# Patient Record
Sex: FEMALE | Race: WHITE | NOT HISPANIC OR LATINO | Employment: FULL TIME | URBAN - NONMETROPOLITAN AREA
[De-identification: names, ages, dates, MRNs, and addresses within clinical notes are randomized per-mention and may not be internally consistent; named-entity substitution may affect disease eponyms.]

---

## 2019-04-23 ENCOUNTER — OFFICE VISIT CONVERTED (OUTPATIENT)
Dept: FAMILY MEDICINE CLINIC | Age: 54
End: 2019-04-23
Attending: NURSE PRACTITIONER

## 2019-05-21 ENCOUNTER — OFFICE VISIT CONVERTED (OUTPATIENT)
Dept: FAMILY MEDICINE CLINIC | Age: 54
End: 2019-05-21
Attending: NURSE PRACTITIONER

## 2019-06-03 ENCOUNTER — OFFICE VISIT CONVERTED (OUTPATIENT)
Dept: FAMILY MEDICINE CLINIC | Age: 54
End: 2019-06-03
Attending: NURSE PRACTITIONER

## 2019-06-03 ENCOUNTER — HOSPITAL ENCOUNTER (OUTPATIENT)
Dept: OTHER | Facility: HOSPITAL | Age: 54
Discharge: HOME | End: 2019-06-03
Attending: NURSE PRACTITIONER

## 2019-06-06 LAB
CONV LAST MENSTURAL PERIOD: NORMAL
HPV HYBRID CAPTURE HIGH RISK: NEGATIVE
SPECIMEN SOURCE: NORMAL
SPECIMEN SOURCE: NORMAL
THIN PREP CVX: NORMAL

## 2019-06-12 ENCOUNTER — HOSPITAL ENCOUNTER (OUTPATIENT)
Dept: OTHER | Facility: HOSPITAL | Age: 54
Discharge: HOME | End: 2019-06-12
Attending: NURSE PRACTITIONER

## 2019-06-12 LAB
ALBUMIN SERPL-MCNC: 4.3 G/DL (ref 3.5–5)
ALBUMIN/GLOB SERPL: 1.7 {RATIO} (ref 1.4–2.6)
ALP SERPL-CCNC: 77 U/L (ref 53–141)
ALT SERPL-CCNC: 17 U/L (ref 10–40)
ANION GAP SERPL CALC-SCNC: 16 MMOL/L (ref 8–19)
AST SERPL-CCNC: 15 U/L (ref 15–50)
BASOPHILS # BLD MANUAL: 0.04 10*3/UL (ref 0–0.2)
BASOPHILS NFR BLD MANUAL: 0.5 % (ref 0–3)
BILIRUB SERPL-MCNC: 0.26 MG/DL (ref 0.2–1.3)
BUN SERPL-MCNC: 9 MG/DL (ref 5–25)
BUN/CREAT SERPL: 12 {RATIO} (ref 6–20)
CALCIUM SERPL-MCNC: 9.2 MG/DL (ref 8.7–10.4)
CHLORIDE SERPL-SCNC: 104 MMOL/L (ref 99–111)
CHOLEST SERPL-MCNC: 161 MG/DL (ref 107–200)
CHOLEST/HDLC SERPL: 3 {RATIO} (ref 3–6)
CONV CO2: 24 MMOL/L (ref 22–32)
CONV TOTAL PROTEIN: 6.9 G/DL (ref 6.3–8.2)
CREAT UR-MCNC: 0.78 MG/DL (ref 0.5–0.9)
DEPRECATED RDW RBC AUTO: 39.3 FL
EOSINOPHIL # BLD MANUAL: 0.08 10*3/UL (ref 0–0.7)
EOSINOPHIL NFR BLD MANUAL: 0.9 % (ref 0–7)
ERYTHROCYTE [DISTWIDTH] IN BLOOD BY AUTOMATED COUNT: 11.7 % (ref 11.5–14.5)
GFR SERPLBLD BASED ON 1.73 SQ M-ARVRAT: >60 ML/MIN/{1.73_M2}
GLOBULIN UR ELPH-MCNC: 2.6 G/DL (ref 2–3.5)
GLUCOSE SERPL-MCNC: 93 MG/DL (ref 65–99)
GRANS (ABSOLUTE): 6.65 10*3/UL (ref 2–8)
GRANS: 75.2 % (ref 30–85)
HBA1C MFR BLD: 14.4 G/DL (ref 12–16)
HCT VFR BLD AUTO: 41.7 % (ref 37–47)
HDLC SERPL-MCNC: 53 MG/DL (ref 40–60)
IMM GRANULOCYTES # BLD: 0.03 10*3/UL (ref 0–0.54)
IMM GRANULOCYTES NFR BLD: 0.3 % (ref 0–0.43)
LDLC SERPL CALC-MCNC: 82 MG/DL (ref 70–100)
LYMPHOCYTES # BLD MANUAL: 1.64 10*3/UL (ref 1–5)
LYMPHOCYTES NFR BLD MANUAL: 4.6 % (ref 3–10)
MCH RBC QN AUTO: 31.2 PG (ref 27–31)
MCHC RBC AUTO-ENTMCNC: 34.5 G/DL (ref 33–37)
MCV RBC AUTO: 90.5 FL (ref 81–99)
MONOCYTES # BLD AUTO: 0.41 10*3/UL (ref 0.2–1.2)
OSMOLALITY SERPL CALC.SUM OF ELEC: 288 MOSM/KG (ref 273–304)
PLATELET # BLD AUTO: 266 10*3/UL (ref 130–400)
PMV BLD AUTO: 9.4 FL (ref 7.4–10.4)
POTASSIUM SERPL-SCNC: 4.3 MMOL/L (ref 3.5–5.3)
RBC # BLD AUTO: 4.61 10*6/UL (ref 4.2–5.4)
SODIUM SERPL-SCNC: 140 MMOL/L (ref 135–147)
TRIGL SERPL-MCNC: 128 MG/DL (ref 40–150)
TSH SERPL-ACNC: 2.09 M[IU]/L (ref 0.27–4.2)
VARIANT LYMPHS NFR BLD MANUAL: 18.5 % (ref 20–45)
VLDLC SERPL-MCNC: 26 MG/DL (ref 5–37)
WBC # BLD AUTO: 8.85 10*3/UL (ref 4.8–10.8)

## 2019-12-06 ENCOUNTER — OFFICE VISIT CONVERTED (OUTPATIENT)
Dept: FAMILY MEDICINE CLINIC | Age: 54
End: 2019-12-06
Attending: NURSE PRACTITIONER

## 2020-01-29 ENCOUNTER — OFFICE VISIT CONVERTED (OUTPATIENT)
Dept: FAMILY MEDICINE CLINIC | Age: 55
End: 2020-01-29
Attending: NURSE PRACTITIONER

## 2020-07-08 ENCOUNTER — OFFICE VISIT CONVERTED (OUTPATIENT)
Dept: FAMILY MEDICINE CLINIC | Age: 55
End: 2020-07-08
Attending: NURSE PRACTITIONER

## 2020-08-05 ENCOUNTER — HOSPITAL ENCOUNTER (OUTPATIENT)
Dept: OTHER | Facility: HOSPITAL | Age: 55
Discharge: HOME | End: 2020-08-05
Attending: NURSE PRACTITIONER

## 2020-08-05 LAB
ALBUMIN SERPL-MCNC: 4 G/DL (ref 3.5–5)
ALBUMIN/GLOB SERPL: 1.4 {RATIO} (ref 1.4–2.6)
ALP SERPL-CCNC: 73 U/L (ref 53–141)
ALT SERPL-CCNC: 22 U/L (ref 10–40)
ANION GAP SERPL CALC-SCNC: 15 MMOL/L (ref 8–19)
AST SERPL-CCNC: 19 U/L (ref 15–50)
BASOPHILS # BLD MANUAL: 0.06 10*3/UL (ref 0–0.2)
BASOPHILS NFR BLD MANUAL: 0.9 % (ref 0–3)
BILIRUB SERPL-MCNC: 0.29 MG/DL (ref 0.2–1.3)
BUN SERPL-MCNC: 11 MG/DL (ref 5–25)
BUN/CREAT SERPL: 13 {RATIO} (ref 6–20)
CALCIUM SERPL-MCNC: 9.2 MG/DL (ref 8.7–10.4)
CHLORIDE SERPL-SCNC: 103 MMOL/L (ref 99–111)
CHOLEST SERPL-MCNC: 186 MG/DL (ref 107–200)
CHOLEST/HDLC SERPL: 3.5 {RATIO} (ref 3–6)
CONV CO2: 26 MMOL/L (ref 22–32)
CONV TOTAL PROTEIN: 6.8 G/DL (ref 6.3–8.2)
CREAT UR-MCNC: 0.86 MG/DL (ref 0.5–0.9)
DEPRECATED RDW RBC AUTO: 39.8 FL
EOSINOPHIL # BLD MANUAL: 0.07 10*3/UL (ref 0–0.7)
EOSINOPHIL NFR BLD MANUAL: 1.1 % (ref 0–7)
ERYTHROCYTE [DISTWIDTH] IN BLOOD BY AUTOMATED COUNT: 11.8 % (ref 11.5–14.5)
GFR SERPLBLD BASED ON 1.73 SQ M-ARVRAT: >60 ML/MIN/{1.73_M2}
GLOBULIN UR ELPH-MCNC: 2.8 G/DL (ref 2–3.5)
GLUCOSE SERPL-MCNC: 86 MG/DL (ref 65–99)
GRANS (ABSOLUTE): 4.54 10*3/UL (ref 2–8)
GRANS: 70.9 % (ref 30–85)
HBA1C MFR BLD: 13.7 G/DL (ref 12–16)
HCT VFR BLD AUTO: 38.9 % (ref 37–47)
HDLC SERPL-MCNC: 53 MG/DL (ref 40–60)
IMM GRANULOCYTES # BLD: 0.01 10*3/UL (ref 0–0.54)
IMM GRANULOCYTES NFR BLD: 0.2 % (ref 0–0.43)
LDLC SERPL CALC-MCNC: 96 MG/DL (ref 70–100)
LYMPHOCYTES # BLD MANUAL: 1.38 10*3/UL (ref 1–5)
LYMPHOCYTES NFR BLD MANUAL: 5.3 % (ref 3–10)
MCH RBC QN AUTO: 32.2 PG (ref 27–31)
MCHC RBC AUTO-ENTMCNC: 35.2 G/DL (ref 33–37)
MCV RBC AUTO: 91.5 FL (ref 81–99)
MONOCYTES # BLD AUTO: 0.34 10*3/UL (ref 0.2–1.2)
OSMOLALITY SERPL CALC.SUM OF ELEC: 289 MOSM/KG (ref 273–304)
PLATELET # BLD AUTO: 266 10*3/UL (ref 130–400)
PMV BLD AUTO: 9.1 FL (ref 7.4–10.4)
POTASSIUM SERPL-SCNC: 4.1 MMOL/L (ref 3.5–5.3)
RBC # BLD AUTO: 4.25 10*6/UL (ref 4.2–5.4)
SODIUM SERPL-SCNC: 140 MMOL/L (ref 135–147)
TRIGL SERPL-MCNC: 185 MG/DL (ref 40–150)
TSH SERPL-ACNC: 1.56 M[IU]/L (ref 0.27–4.2)
VARIANT LYMPHS NFR BLD MANUAL: 21.6 % (ref 20–45)
VLDLC SERPL-MCNC: 37 MG/DL (ref 5–37)
WBC # BLD AUTO: 6.4 10*3/UL (ref 4.8–10.8)

## 2020-08-06 LAB — HCV AB SER DONR QL: 0.1 S/CO RATIO (ref 0–0.9)

## 2020-09-10 ENCOUNTER — HOSPITAL ENCOUNTER (OUTPATIENT)
Dept: OTHER | Facility: HOSPITAL | Age: 55
Discharge: HOME | End: 2020-09-10
Attending: NURSE PRACTITIONER

## 2020-09-22 ENCOUNTER — HOSPITAL ENCOUNTER (OUTPATIENT)
Dept: OTHER | Facility: HOSPITAL | Age: 55
Discharge: HOME | End: 2020-09-22
Attending: NURSE PRACTITIONER

## 2020-09-30 ENCOUNTER — HOSPITAL ENCOUNTER (OUTPATIENT)
Dept: MAMMOGRAPHY | Facility: HOSPITAL | Age: 55
Discharge: HOME | End: 2020-09-30
Attending: NURSE PRACTITIONER

## 2020-10-15 ENCOUNTER — OFFICE VISIT CONVERTED (OUTPATIENT)
Dept: SURGERY | Facility: CLINIC | Age: 55
End: 2020-10-15
Attending: SURGERY

## 2020-10-27 ENCOUNTER — HOSPITAL ENCOUNTER (OUTPATIENT)
Dept: ULTRASOUND IMAGING | Facility: HOSPITAL | Age: 55
Discharge: HOME | End: 2020-10-27
Attending: SURGERY

## 2020-11-04 ENCOUNTER — OFFICE VISIT CONVERTED (OUTPATIENT)
Dept: SURGERY | Facility: CLINIC | Age: 55
End: 2020-11-04
Attending: SURGERY

## 2020-11-06 ENCOUNTER — HOSPITAL ENCOUNTER (OUTPATIENT)
Dept: MRI IMAGING | Facility: HOSPITAL | Age: 55
Discharge: HOME | End: 2020-11-06
Attending: SURGERY

## 2020-11-06 LAB
CREAT BLD-MCNC: 0.8 MG/DL (ref 0.6–1.4)
GFR SERPLBLD BASED ON 1.73 SQ M-ARVRAT: >60 ML/MIN/{1.73_M2}

## 2020-11-12 ENCOUNTER — OFFICE VISIT CONVERTED (OUTPATIENT)
Dept: PLASTIC SURGERY | Facility: CLINIC | Age: 55
End: 2020-11-12
Attending: PLASTIC SURGERY

## 2020-11-12 ENCOUNTER — CONVERSION ENCOUNTER (OUTPATIENT)
Dept: PLASTIC SURGERY | Facility: CLINIC | Age: 55
End: 2020-11-12

## 2020-11-13 ENCOUNTER — HOSPITAL ENCOUNTER (OUTPATIENT)
Dept: PHYSICAL THERAPY | Facility: CLINIC | Age: 55
Setting detail: RECURRING SERIES
Discharge: HOME | End: 2020-11-16
Attending: SURGERY

## 2020-11-18 ENCOUNTER — HOSPITAL ENCOUNTER (OUTPATIENT)
Dept: MRI IMAGING | Facility: HOSPITAL | Age: 55
Discharge: HOME | End: 2020-11-18
Attending: SURGERY

## 2020-11-25 ENCOUNTER — HOSPITAL ENCOUNTER (OUTPATIENT)
Dept: PREADMISSION TESTING | Facility: HOSPITAL | Age: 55
Discharge: HOME | End: 2020-11-25
Attending: SURGERY

## 2020-11-27 LAB — SARS-COV-2 RNA SPEC QL NAA+PROBE: NOT DETECTED

## 2020-12-01 ENCOUNTER — HOSPITAL ENCOUNTER (OUTPATIENT)
Dept: PERIOP | Facility: HOSPITAL | Age: 55
Setting detail: HOSPITAL OUTPATIENT SURGERY
Discharge: HOME | End: 2020-12-01
Attending: SURGERY

## 2020-12-04 ENCOUNTER — CONVERSION ENCOUNTER (OUTPATIENT)
Dept: PLASTIC SURGERY | Facility: CLINIC | Age: 55
End: 2020-12-04

## 2020-12-04 ENCOUNTER — OFFICE VISIT CONVERTED (OUTPATIENT)
Dept: PLASTIC SURGERY | Facility: CLINIC | Age: 55
End: 2020-12-04
Attending: NURSE PRACTITIONER

## 2020-12-09 ENCOUNTER — OFFICE VISIT CONVERTED (OUTPATIENT)
Dept: SURGERY | Facility: CLINIC | Age: 55
End: 2020-12-09
Attending: SURGERY

## 2020-12-11 ENCOUNTER — CONVERSION ENCOUNTER (OUTPATIENT)
Dept: PLASTIC SURGERY | Facility: CLINIC | Age: 55
End: 2020-12-11

## 2020-12-11 ENCOUNTER — OFFICE VISIT CONVERTED (OUTPATIENT)
Dept: PLASTIC SURGERY | Facility: CLINIC | Age: 55
End: 2020-12-11
Attending: NURSE PRACTITIONER

## 2020-12-22 ENCOUNTER — OFFICE VISIT CONVERTED (OUTPATIENT)
Dept: FAMILY MEDICINE CLINIC | Age: 55
End: 2020-12-22
Attending: NURSE PRACTITIONER

## 2021-01-08 ENCOUNTER — OFFICE VISIT CONVERTED (OUTPATIENT)
Dept: PLASTIC SURGERY | Facility: CLINIC | Age: 56
End: 2021-01-08
Attending: PLASTIC SURGERY

## 2021-02-09 ENCOUNTER — OFFICE VISIT CONVERTED (OUTPATIENT)
Dept: ONCOLOGY | Facility: HOSPITAL | Age: 56
End: 2021-02-09
Attending: INTERNAL MEDICINE

## 2021-02-09 ENCOUNTER — HOSPITAL ENCOUNTER (OUTPATIENT)
Dept: ONCOLOGY | Facility: HOSPITAL | Age: 56
Discharge: HOME | End: 2021-02-09
Attending: INTERNAL MEDICINE

## 2021-03-12 ENCOUNTER — OFFICE VISIT CONVERTED (OUTPATIENT)
Dept: ONCOLOGY | Facility: HOSPITAL | Age: 56
End: 2021-03-12
Attending: NURSE PRACTITIONER

## 2021-03-12 ENCOUNTER — HOSPITAL ENCOUNTER (OUTPATIENT)
Dept: ONCOLOGY | Facility: HOSPITAL | Age: 56
Discharge: HOME | End: 2021-03-12
Attending: NURSE PRACTITIONER

## 2021-03-12 ENCOUNTER — OFFICE VISIT CONVERTED (OUTPATIENT)
Dept: PLASTIC SURGERY | Facility: CLINIC | Age: 56
End: 2021-03-12
Attending: PLASTIC SURGERY

## 2021-05-11 NOTE — H&P
"   History and Physical      Patient Name: Audrey Damon   Patient ID: [id number]   Sex: Female   YOB: 1965    Primary Care Provider: Christina BOSS   Referring Provider: Christina BOSS    Visit Date: November 4, 2020    Provider: Sena Hawkins MD   Location: [facility]   Location Address: [address] [place] [address]  [id number]   Location Phone: [phone]          Chief Complaint  · Breast Cancer  · Pre-Surgical History and Physical Examination for [facility]  · Consents for Surgery      History Of Present Illness  Audrey Damon is a 54 year old /White female who is referred from Christina BOSS ; she had a 0.7 cm right breast posterioroly located at 1 oclcok about 9 cm FTN and it was biopsied and returned as:   Palpation-guided or Image Guided needle biopsy:    * Image guided needle biospy performed. Results from the needle guided biopsy are IDC grade 1 ER/DC + and her 2 negative with ki 67 of 2%.   Breast Cancer Staging:  * T 1 ;N 0 ;M 0   * 1 Stage Group       Past Medical History  High blood pressure         Medication List  losartan 50 mg oral tablet         Allergy List  NO KNOWN DRUG ALLERGIES         Family Medical History  Diabetes, unspecified type         Social History  Tobacco (Never)         Review of Systems  · Constitutional  o Denies  o : fever, chills  · Breasts  o * See HPI  · Cardiovascular  o Denies  o : chest pain, cardiac murmurs, irregular heart beats, dyspnea on exertion  · Integument  o Denies  o : rash, itching, new skin lesions  · Heme-Lymph  o Denies  o : easy bleeding, easy bruising, lymph node enlargement or tenderness      Vitals  Date Time BP Position Site L\R Cuff Size HR RR TEMP (F) WT  HT  BMI kg/m2 BSA m2 O2 Sat FR L/min FiO2        11/04/2020 11:14 AM       16  189lbs 0oz 5'  5\" 31.45 1.98             Physical Examination  · Constitutional  o Appearance  o : A well-nourished, " well-developed patient who ambulates without difficulty, Alert and Oriented X3.  · Respiratory  o Respiratory Effort  o : breathing unlabored  o Inspection of Chest  o : breaths equal bilaterally  · Breasts  o Inspection of Breasts  o : developmental state normal for age  o Palpation of Breasts, Axillae  o : no masses or tenderness noted on palpation, well helaed biopsy site, no LAD, no skin or nipple lesions          Assessment  · Malignant neoplasm of upper-inner quadrant of right breast in female, estrogen receptor positive       Malignant neoplasm of upper-inner quadrant of right female breast     174.2/C50.211  Estrogen receptor positive status [ER+]     174.2/Z17.0  · Preoperative Examination     V72.83      Plan  · Orders  o General Surgery Order (GENOR) - - 11/16/2020  o Lymphedema Clinic Consult (Order_PT/OT Consult for Bioimpedence Lymph fluid analysis,Pre-op and retest Post-op, every 3 mo. Yr. 1-3, 6 mo. Yr. 4-5, annually year 6+) (LYMCL) - 174.2/C50.211, 174.2/Z17.0 - 11/04/2020  o Plastics reconstructive visit (PSREC) - 174.2/C50.211, 174.2/Z17.0 - 11/04/2020  o [place] Node(Right)Identification(Detection) [[id number]] (61528) - 174.2/C50.211, 174.2/Z17.0 - 12/01/2020  o Needle localization, percutaneous, for wire placement, 1st lesion, mammographic guidance [facility] (50038) - 174.2/C50.211, 174.2/Z17.0 - 12/01/2020  o Needle localization, percutaneous, for wire placement, 1st lesion, ultrasound guidance [facility] (07545) - 174.2/C50.211, 174.2/Z17.0 - 12/01/2020  o General Surgery Order (GENOR) - 174.2/C50.211, 174.2/Z17.0 - 12/01/2020  o [facility] [place] (89473) - 174.2/C50.211, 174.2/Z17.0 - 11/25/2020  · Medications  o Medications have been Reconciled  o Transition of Care or Provider Policy  · Instructions  o DISCUSSION:  o PLAN:  o She has an MRI on Friday and would like to see Dr Hernandez for possible combo oncoplastic reduction  o Schedule operation  o ****Surgical Treatment  Addendum****  o Breast Conserving Therapy: Offered  o [place] node dissection offfered and will perform as a part of surgery.  o Reconstructive /Plastic Surgery referral offered prior to surgery.  o Handouts Provided-Pre-Procedure Instructions including date and time and location of procedure.  o ****Surgical Orders****  o ****Patient Status****  o RISK AND BENEFITS:  o Consent for surgery: Given these options, the patient has verbally expressed an understanding of the risks of surgery and finds these risks acceptable. We will proceed with surgery as soon as possible.  o Possible risks, complications, benefits, and alternatives to surgical or invasive procedure have been explained to patient and/or legal guardian.  o O.R. PREP: Per protocol  o IV: Per Anesthesia  o Please sign permit for: Right breast needle localized lumpectomy with sentinel node identification and biopsy  o Kefzol 2 grams IV on call to OR.  o The above History and Physical Examination has been completed within 30 days of admission.            Electronically Signed by: Sena Hawkins MD -Author on November 16, 2020 11:46:19 AM

## 2021-05-11 NOTE — H&P
"   History and Physical      Patient Name: Audrey Damon   Patient ID: [id number]   Sex: Female   YOB: 1965    Primary Care Provider: Christina BOSS   Referring Provider: Christina BOSS    Visit Date: October 15, 2020    Provider: Sena Hawkins MD   Location: [facility]   Location Address: [address] [place] [address]  [id number]   Location Phone: [phone]          Chief Complaint  · Rigth breast mass      History Of Present Illness  Audrey Damon is a 54 year old /White female who is referred from Christina BOSS ; she had a 0.7 cm taller than wide mass at 1 oclcok about 9 cm from the nipple and abutting the pec muscle... a stereotactic biopsy was recommended but could not be done and after discussing with Dr Coulter we will try an u/s guided biopsy to localize for biopsy.             Past Medical History  High blood pressure         Medication List  losartan 50 mg oral tablet         Allergy List  NO KNOWN DRUG ALLERGIES       Allergies Reconciled  Family Medical History  Diabetes, unspecified type         Social History  Tobacco (Never)         Review of Systems  · Constitutional  o Denies  o : fever, chills  · Breasts  o * See HPI  · Cardiovascular  o Denies  o : chest pain, cardiac murmurs, irregular heart beats, dyspnea on exertion  · Integument  o Denies  o : rash, itching, new skin lesions  · Heme-Lymph  o Denies  o : easy bleeding, easy bruising, lymph node enlargement or tenderness      Vitals  Date Time BP Position Site L\R Cuff Size HR RR TEMP (F) WT  HT  BMI kg/m2 BSA m2 O2 Sat FR L/min FiO2        10/15/2020 10:07 AM       15  189lbs 0oz 5'  5\" 31.45 1.98             Physical Examination  · Constitutional  o Appearance  o : A well-nourished, well-developed patient who ambulates without difficulty, Alert and Oriented X3.  · Respiratory  o Respiratory Effort  o : breathing unlabored  o Inspection of Chest  o : breaths equal " bilaterally  · Breasts  o Inspection of Breasts  o : developmental state normal for age  o Palpation of Breasts, Axillae  o : no masses or tenderness noted on palpation, no nipple or skin lesions          Assessment  · Right Upper Breast Mass     611.72/N63      Plan  · Orders  o Biopsy, breast, with placement of localization device (clip), first lesion, with ultrasound guidance [facility] ([id number]) - 611.72/N63 - 10/27/2020  · Medications  o Medications have been Reconciled  o Transition of Care or Provider Policy  · Instructions  o PLAN:  o Proceed with biopsy            Electronically Signed by: Sena Hawkins MD -Author on October 15, 2020 10:21:22 AM

## 2021-05-11 NOTE — H&P
History and Physical      Patient Name: Audrey Damon   Patient ID: [id number]   Sex: Female   YOB: 1965    Primary Care Provider: Christina BOSS   Referring Provider: Christina BOSS    Visit Date: November 12, 2020    Provider: Clemencia Hernandez MD   [facility]   Location Address: [address] [place] [address]  [id number]   Location Phone: [phone]          Chief Complaint  · Breast Cancer      History Of Present Illness  Audrey Damon is a 55 year old /White female who presents to the office today as a consult from Christina BOSS.   Audrey Damon is a 55 year old /White female who presents to the office as a consult from Christina BOSS.      Patient states she has seen Dr. Hawkins and done a work up on her right breast for right breast cancer, has another MRI biopsy of additional lesion next week. Patient states she is wearing a D but could be a DD because clothes aren't fitting right. Patient states she would want to be the smallest we can go. Patient states Dr. Hawkins states she doesn't need radiation or chemo. Patient states she was wanting to go with lumpectomy. Patient understands she will need radiation if she goes with lumpectomy. Patient states she has high blood pressure. Patient states is controlled until diagnosed with breast cancer.       Past Medical History  Breast cancer; High blood pressure         Past Surgical History  C section         Medication List  losartan 50 mg oral tablet         Allergy List  NO KNOWN DRUG ALLERGIES         Family Medical History  Heart Disease; Diabetes, unspecified type         Social History  Tobacco (Never)         Review of Systems  · Cardiovascular  o Denies  o : chest pain, lower extremity edema, Heart Attack, Abnormal EKG  · Respiratory  o Denies  o : shortness of breath, wheezing, cough  · Neurologic  o Denies  o : muscular weakness,  incoordination, tingling or numbness, headaches  · Psychiatric  o Denies  o : anxiety, depression, difficulty sleeping      Vitals  Date Time BP Position Site L\R Cuff Size HR RR TEMP (F) WT  HT  BMI kg/m2 BSA m2 O2 Sat FR L/min FiO2 HC       11/12/2020 09:08 /84 Sitting    85 - R  98     97 %  21%          Physical Examination  · Constitutional  o Appearance  o : well developed, well-nourished, Alert and oriented X3  · Eyes  o Sclerae  o : sclerae white  · Respiratory  o Respiratory Effort  o : breathing unlabored   · Cardiovascular  o Heart  o : regular rate  · Breasts  o FEMALE BREAST EXAM  o :   § Masses  § : no masses  § Nipple Discharge  § : no nipple discharge  § Axillary Lymphadenopathy  § : no axillary lymphadenopathy  § SN-N (Right Breast)  § : 29  § SN-N (Left Breast)  § : 28  § SN-IMF (Right Breast)  § : 23  § SN-IMF (Left Breast)  § : 23  § N-IMF stretch (Right Breast)  § : 13  § N-IMF stretch (Left Breast)  § : 13  · Gastrointestinal  o Abdominal Examination  o : abdomen nontender to palpation  · Lymphatic  o Axilla  o : No lymphadenopathy present  · Skin and Subcutaneous Tissue  o General Inspection  o : no lesions present, no areas of discoloration, skin turgor normal, texture normal  · Psychiatric  o Judgement and Insight  o : judgment and insight intact  o Mood and Affect  o : mood normal, affect appropriate     well healed biopsy scar right upper outer quadrant. Right breast hangs lower. Bilateral macromastia                 Assessment  · Pre-operative examination     V72.84/Z01.818  · Breast cancer, female     174.9/C50.919  · Macromastia       Hypertrophy of breast     611.1/N62      Plan  · Orders  o Plastics reconstructive visit (PSREC) - - 11/14/2020  · Medications  o Medications have been Reconciled  o Transition of Care or Provider Policy  · Instructions  o Surgical Facility: [facility]   o ****Surgical Orders****  o ****Patient Status****  o ********************  o RISK  AND BENEFITS:  o Consent for surgery: Given these options, the patient has verbally expressed an understanding of the risks of surgery and finds these risks acceptable. We will proceed with surgery as soon as possible.  o O.R. PREP: Per protocol  o IV: Per Anesthesia  o SCD's preoperatively  o Please sign permit for: bilateral reduction mammaplasty  o Please do not give any anethesia until patient's site has been marked.   o *******************************************  o PRE- OP MEDICATION ORDER:  o *******************************************  o Kefzol 2 grams IV on call to OR.  o Prior to procedure, patient had negative COVID verbal screening and signed COVID consent.  o The above History and Physical Examination has been completed within 30 days of admission.  o ********************  o Breast reconstruction options (Tissue Expander/Implant versus Autologous) were all discussed with the pt at length. In addition, we discussed options for symmetry procedures and nipple reconstruction. The patient understands that her reconstructed breast will not have the same sensation as a natural breast and that visible incision lines will always be present. In addition, patient understands that breast reconstruction is a multi-stage procedure that can take months to years to complete.   o After thorough discussion of risk and benefits of each procedure the patient has elected to proceed with bilateral oncoplastic breast reduction. She understands she is at higher risk of complications due to combo surgery such as wound healing problems and nipple necrosis, asymmetry.  o We discussed the procedure for bilateral breast reduction under general anesthesia as well as the postoperative recovery time and the need for limitation of activities. The risks of surgery were discussed including bleeding, infection, scarring (for which diagrams were drawn), pain, poor healing, loss of skin and or nipple, change in nipple sensation, inability  to breast feed, asymmetry, no guarantee of postoperative cup size.  o Electronically Identified Patient Education Materials Provided Electronically            Electronically Signed by: Clemencia Hernandez MD -Author on November 14, 2020 02:03:08 PM

## 2021-05-11 NOTE — H&P
History and Physical      Patient Name: Audrey Damon   Patient ID: [id number]   Sex: Female   YOB: 1965    Primary Care Provider: Christina BOSS   Referring Provider: Christina BOSS    Visit Date: December 9, 2020    Provider: Sena Hawkins MD   Location: [facility]   Location Address: [address] [place] [address]  [id number]   Location Phone: [phone]          Chief Complaint  · Follow Up Surgery      History Of Present Illness  Audrey Damon is a 55 year old /White female who is here today for follow up of: right breast lumpectomy with sentinel node and reduction by Dr David french.   She is doing well-status post surgery. Pathology was negative margins and negative nodes.       Past Medical History  Breast cancer; High blood pressure         Past Surgical History  breast reduction; C section         Medication List  losartan 50 mg oral tablet         Allergy List  NO KNOWN DRUG ALLERGIES       Allergies Reconciled  Family Medical History  Heart Disease; Diabetes, unspecified type         Social History  Tobacco (Never)         Review of Systems  · Constitutional  o Denies  o : fever, chills  · Eyes  o Denies  o : yellowish discoloration of the eyes, eye pain  · HENT  o Denies  o : difficulty swallowing, hoarseness  · Breasts  o * See HPI  · Cardiovascular  o Denies  o : chest pain on exertion, irregular heart beats  · Respiratory  o Denies  o : shortness of breath, cough  · Gastrointestinal  o Denies  o : nausea, vomiting, diarrhea, constipation  · Integument  o Admits  o : see HPI for surgical incision healing  · Neurologic  o Denies  o : tingling or numbness, loss of balance  · Musculoskeletal  o Denies  o : joint pain, back pain  · Endocrine  o Denies  o : weight gain, weight loss  · All Others Negative      Vitals  Date Time BP Position Site L\R Cuff Size HR RR TEMP (F) WT  HT  BMI kg/m2 BSA m2 O2 Sat FR L/min FiO2 HC      "  12/09/2020 10:41 AM       15  189lbs 0oz 5'  5\" 31.45 1.98             Physical Examination  · Constitutional  o Appearance  o : well developed/well nourished patient in no apparent distress  · Respiratory  o Inspection of Chest  o : equal breaths bilaterally  · Gastrointestinal  o Abdominal Examination  o : soft/nontender, nondistended, no organomegaly appreciated  · Post Surgical Incision  o Surgical wound  o : healing well, no erythema          Assessment  · Postoperative Exam Following Surgery     V67.00/Z09      Plan  · Medications  o Medications have been Reconciled  o Transition of Care or Provider Policy  · Instructions  o Keep apt with Dr Hernandez and needs to see Dr Stuart for rad onc  o Return to office with any issues.  o F/U PRN            Electronically Signed by: Sena Hawkins MD -Author on December 9, 2020 10:47:13 AM  "

## 2021-05-14 VITALS
SYSTOLIC BLOOD PRESSURE: 143 MMHG | TEMPERATURE: 97.1 F | HEART RATE: 76 BPM | OXYGEN SATURATION: 97 % | DIASTOLIC BLOOD PRESSURE: 85 MMHG

## 2021-05-14 VITALS — HEIGHT: 65 IN | RESPIRATION RATE: 15 BRPM | BODY MASS INDEX: 31.49 KG/M2 | WEIGHT: 189 LBS

## 2021-05-14 VITALS
SYSTOLIC BLOOD PRESSURE: 142 MMHG | HEART RATE: 86 BPM | DIASTOLIC BLOOD PRESSURE: 57 MMHG | OXYGEN SATURATION: 96 % | TEMPERATURE: 97.7 F

## 2021-05-14 VITALS — BODY MASS INDEX: 31.49 KG/M2 | HEIGHT: 65 IN | WEIGHT: 189 LBS | RESPIRATION RATE: 15 BRPM

## 2021-05-14 VITALS
SYSTOLIC BLOOD PRESSURE: 128 MMHG | TEMPERATURE: 97.8 F | HEART RATE: 72 BPM | OXYGEN SATURATION: 96 % | DIASTOLIC BLOOD PRESSURE: 85 MMHG

## 2021-05-14 VITALS
TEMPERATURE: 98 F | SYSTOLIC BLOOD PRESSURE: 133 MMHG | DIASTOLIC BLOOD PRESSURE: 84 MMHG | HEART RATE: 85 BPM | OXYGEN SATURATION: 97 %

## 2021-05-14 VITALS
SYSTOLIC BLOOD PRESSURE: 124 MMHG | OXYGEN SATURATION: 95 % | TEMPERATURE: 98 F | HEART RATE: 82 BPM | DIASTOLIC BLOOD PRESSURE: 84 MMHG

## 2021-05-14 VITALS — HEIGHT: 65 IN | RESPIRATION RATE: 16 BRPM | BODY MASS INDEX: 31.49 KG/M2 | WEIGHT: 189 LBS

## 2021-05-14 NOTE — PROGRESS NOTES
Progress Note      Patient Name: Audrey Damon   Patient ID: [id number]   Sex: Female   YOB: 1965    Primary Care Provider: Christina BOSS   Referring Provider: Christina BOSS    Visit Date: December 4, 2020    Provider: KARYN Hurt   Location: [facility]   Location Address: [address] [place] [address]  [id number]   Location Phone: [phone]          Chief Complaint  · Follow Up Visit, Breast Reduction      History Of Present Illness  Audrey Damon is a 55 year old /White female who presents to the office today as a consult from Christina BOSS.   Audrey Damon is a 55 year old /White female who presents to the office today for a follow up visit status post breast reduction on 12/01/2020. She is doing very well. Steri-strips have some dried blood around them under breast.   Pain Rating on the Casi's Scale is: 1/10       Past Medical History  Breast cancer; High blood pressure         Past Surgical History  breast reduction; C section         Medication List  losartan 50 mg oral tablet         Allergy List  NO KNOWN DRUG ALLERGIES         Family Medical History  Heart Disease; Diabetes, unspecified type         Social History  Tobacco (Never)         Review of Systems  · Cardiovascular  o Denies  o : chest pain, lower extremity edema, Heart Attack, Abnormal EKG  · Respiratory  o Denies  o : shortness of breath, wheezing, cough  · Neurologic  o Denies  o : muscular weakness, incoordination, tingling or numbness, headaches  · Psychiatric  o Denies  o : anxiety, depression, difficulty sleeping      Vitals  Date Time BP Position Site L\R Cuff Size HR RR TEMP (F) WT  HT  BMI kg/m2 BSA m2 O2 Sat FR L/min FiO2 HC       12/04/2020 10:06 /84 Sitting    82 - R  98     95 %  21%          Physical Examination  · Constitutional  o Appearance  o : well developed, well-nourished, Alert and oriented  X3  · Respiratory  o Respiratory Effort  o : breathing unlabored   · Cardiovascular  o Heart  o : regular rate  · Skin and Subcutaneous Tissue  o Surgical Incision  o : steri strips in place, good symmetry, expected ecchymosis and edema, nipples viable, no drng          Assessment  · Postoperative follow-up     V67.00/Z09  · Macromastia       Hypertrophy of breast     611.1/N62      Plan  · Orders  o Plastics reconstructive visit (PSREC) - - 12/04/2020  · Medications  o Medications have been Reconciled  o Transition of Care or Provider Policy  · Instructions  o Steri-strips under breast changed due to dried bloody drainage, continue bra, limit activity, wean pain medications, RTC 1 week.            Electronically Signed by: KARYN Hurt -Author on December 4, 2020 11:01:04 AM

## 2021-05-14 NOTE — PROGRESS NOTES
Progress Note      Patient Name: Audrey Damon   Patient ID: [id number]   Sex: Female   YOB: 1965    Primary Care Provider: Christina BOSS   Referring Provider: Christina BOSS    Visit Date: December 11, 2020    Provider: KARYN Hurt   Location: [facility]   Location Address: [address] [place] [address]  [id number]   Location Phone: [phone]          History Of Present Illness  Audrey Damon is a 55 year old /White female who presents to the office today as a consult from Christina BOSS.   Audrey Damon is a 55 year old /White female who presents to the office today for a follow up visit status post breast reduction on 12/01/2020. She is doing very well.   Pain Rating on the Casi's Scale is: 1/10       Past Medical History  Breast cancer; High blood pressure         Past Surgical History  breast reduction; C section         Medication List  losartan 50 mg oral tablet         Allergy List  NO KNOWN DRUG ALLERGIES         Family Medical History  Heart Disease; Diabetes, unspecified type         Social History  Tobacco (Never)         Vitals  Date Time BP Position Site L\R Cuff Size HR RR TEMP (F) WT  HT  BMI kg/m2 BSA m2 O2 Sat FR L/min FiO2 HC       12/11/2020 03:11 /57 Sitting    86 - R  97.7     96 %  21%          Physical Examination  · Constitutional  o Appearance  o : well developed, well-nourished, Alert and oriented X3  · Respiratory  o Respiratory Effort  o : breathing unlabored   · Cardiovascular  o Heart  o : regular rate  · Skin and Subcutaneous Tissue  o Surgical Incision  o : steri strips in place, good symmetry, expected ecchymosis and edema, nipples viable, no drng, small firm area medial portion of right breast IMF incision, moderate tenderness, no redness, ecchymosis, lymph node biopsy site wnl          Assessment  · Postoperative  follow-up     V67.00/Z09  · Macromastia       Hypertrophy of breast     611.1/N62      Plan  · Orders  o Plastics reconstructive visit (PSREC) - - 12/11/2020  · Medications  o Medications have been Reconciled  o Transition of Care or Provider Policy  · Instructions  o continue bra, limit activity, may drive, may shower, RTC 1 month post op.  o Electronically Identified Patient Education Materials Provided Electronically            Electronically Signed by: KARYN Hurt -Author on December 11, 2020 03:28:50 PM

## 2021-05-14 NOTE — PROGRESS NOTES
Progress Note      Patient Name: Audrey Damon   Patient ID: [id number]   Sex: Female   YOB: 1965    Primary Care Provider: Christina BOSS   Referring Provider: Christina BOSS    Visit Date: January 8, 2021    Provider: Clemenica Hernandez MD   [facility]: [facility]   Location Address: [address] [place] [address]  [id number]   Location Phone: [phone]          History Of Present Illness  Audrey Damon is a 55 year old /White female who presents to the office today as a consult from Christina BOSS.   Audrey Damon is a 55 year old /White female who presents to the office today for a follow up visit status post breast reduction on 12/01/2020. She is doing very well.   Pain Rating on the [place]'s Scale is: none      Here today for steri strip removal. Patient noticed a lump on the top of her right breast this week. Denies pain.       Past Medical History  Breast cancer; High blood pressure         Past Surgical History  breast reduction; C section         Medication List  losartan 50 mg oral tablet         Allergy List  NO KNOWN DRUG ALLERGIES         Family Medical History  Heart Disease; Diabetes, unspecified type         Social History  Tobacco (Never)         Vitals  Date Time BP Position Site L\R Cuff Size HR RR TEMP (F) WT  HT  BMI kg/m2 BSA m2 O2 Sat FR L/min FiO2 HC       01/08/2021 08:40 /85 Sitting    76 - R  97.1     97 %  21%          Physical Examination  · Constitutional  o Appearance  o : well developed, well-nourished, Alert and oriented X3  · Respiratory  o Respiratory Effort  o : breathing unlabored   · Cardiovascular  o Heart  o : regular rate  · Skin and Subcutaneous Tissue  o Surgical Incision  o : well healed, good contour          Assessment  · Postoperative follow-up     V67.00/Z09  · Macromastia       Hypertrophy of breast     611.1/N62      Plan  · Orders  o Plastics reconstructive visit  (PSREC) - - 01/08/2021  · Medications  o Medications have been Reconciled  o Transition of Care or Provider Policy  · Instructions  o bra, Aquaphor and scar massage, ok to start radiation, rtc 3 months            Electronically Signed by: Clemencia Hernandez MD -Author on January 8, 2021 04:16:59 PM

## 2021-05-14 NOTE — PROGRESS NOTES
Progress Note      Patient Name: Audrey Damon   Patient ID: [id number]   Sex: Female   YOB: 1965    Primary Care Provider: Christina BOSS   Referring Provider: Christina BOSS    Visit Date: March 12, 2021    Provider: Clemencia Hernandez MD   Location: [facility]   Location Address: [address] [place] [address]  [id number]   Location Phone: [phone]          History Of Present Illness  Audrey Daomn is a 55 year old /White female who presents to the office today as a consult from Christina BOSS.   Audrey Damon is a 55 year old /White female who presents to the office today for a follow up visit status post breast reduction on 12/01/2020. She is doing very well.   Pain Rating on the [place]'s Scale is: none      Doing well. Finished radiation 2/15/21. Had 20 treatments. Denies pain. Is on Tamoxifen now.       Past Medical History  Breast cancer; High blood pressure         Past Surgical History  breast reduction; C section         Medication List  losartan 50 mg oral tablet; tamoxifen 10 mg oral tablet         Allergy List  NO KNOWN DRUG ALLERGIES         Family Medical History  Heart Disease; Diabetes, unspecified type         Social History  Tobacco (Never)         Vitals  Date Time BP Position Site L\R Cuff Size HR RR TEMP (F) WT  HT  BMI kg/m2 BSA m2 O2 Sat FR L/min FiO2 HC       03/12/2021 09:42 /85 Sitting    72 - R  97.8     96 %  21%          Physical Examination  · Constitutional  o Appearance  o : well developed, well-nourished, Alert and oriented X3  · Respiratory  o Respiratory Effort  o : breathing unlabored   · Cardiovascular  o Heart  o : regular rate  · Skin and Subcutaneous Tissue  o Surgical Incision  o : well healed, good contour, radiation changes noted          Assessment  · Breast cancer, female     174.9/C50.919  · Macromastia       Hypertrophy of  breast     611.1/N62      Plan  · Orders  o Plastics reconstructive visit ([facility]) - - 03/13/2021  o Mammogram breast screening 2D digital bilateral. (, 21383) - 611.1/N62 - 06/02/2021   S/p bilateral oncoplastic breast reduction 12/1/2020. New baseline mammogram.   · Medications  o Medications have been Reconciled  o Transition of Care or Provider Policy  · Instructions  o Follow Up in 3 months after repeat new baseline mammogram.  o Continue with scar massage and lotion.            Electronically Signed by: Clemencia Hernandez MD -Author on March 13, 2021 03:00:30 PM

## 2021-05-18 NOTE — PROGRESS NOTES
Woody DamonEileen 1965     Preventive Medicine Services    Visit Date: Mon, Rubén 3, 2019 09:13 am    Provider: Christina Romero N.P. (Assistant: Sarah Spurling, MA)    Location: [facility]        Electronically signed by Christina Romero N.P. on  06/03/2019 02:44:50 PM                             SUBJECTIVE:        CC:     Ms. Damon is a 53 year old White female.  She is here for an annual exam.  Well woman.;         HPI:         PHQ-9 Depression Screening: Completed form scanned and in chart; Total Score 5 Alcohol Consumption Screening: Completed form scanned and in chart; Total Score 0         Her last gynecological exam was four years ago.  The patient's LMP on 5/21/19.  She has no current complaints.  Her last Pap smear was in unknown.  Her last mammogram was scheduled today.  The patient has not previously undergone a DEXA scan.  She performs breast self-exams occasionally.  She is not currently using any form of contraception.      ROS:     CONSTITUTIONAL:  Negative for chills, fatigue and fever.      CARDIOVASCULAR:  Negative for chest pain and pedal edema.      RESPIRATORY:  Negative for recent cough and dyspnea.      GASTROINTESTINAL:  Negative for abdominal pain, constipation, diarrhea, heartburn, nausea and vomiting.      GENITOURINARY:  Positive for urinary incontinence stress incontinence.   Negative for dysuria, vaginal discharge, vaginal itching or change in urine stream.      INTEGUMENTARY/BREAST:  Negative for pruritis and rash.      NEUROLOGICAL:  Negative for dizziness, fainting, headaches and paresthesias.      ENDOCRINE:  Negative for hair loss, polydipsia and polyphagia.      ALLERGIC/IMMUNOLOGIC:  Negative for seasonal allergies.      PSYCHIATRIC:  Negative for anxiety, depression and suicidal thoughts.          PMH/FMH/SH:     Last Reviewed on 6/03/2019 02:44 PM by Christina Romero    Past Medical History:             PREVENTIVE HEALTH MAINTENANCE             BONE  DENSITY: has never been done     DENTAL CLEANING: was last done  - [facility]     EYE EXAM: was last done  - Dr. 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  Non-drinker     Caffeine:  She admits to consuming caffeine via tea.          Substance Abuse History:     Last Reviewed on 2019 06:00 PM by Christina Romero    None         Mental Health History:     Last Reviewed on 2019 06:00 PM by Christina Romero    NEGATIVE         Communicable Diseases (eg STDs):     Last Reviewed on 2019 06:00 PM by Christina Romero    Reportable health conditions; NEGATIVE             Current Problems:     Last Reviewed on 2019 02:44 PM by Christina Romero    Screening for cholesterol level     Fatigue     High blood pressure     Screening mammogram - other     Screening for depression         Immunizations:     zzFluzone pf-quadrivalent 3 and up 10/1/2018         Allergies:     Last Reviewed on 2019 09:21 AM by Spurling, Sarah C      No Known Drug Allergies.         Current Medications:     Last Reviewed on 2019 09:21 AM by Spurling, Sarah C    Aspirin (ASA) 81mg Tablets, Enteric Coated 1 tab daily     Lisinopril 20mg Tablet 1 tab daily          OBJECTIVE:        Vitals:         Current: 6/3/2019 9:24:01 AM    Ht:  5 ft, 6 in;  Wt: 184.4 lbs;  BMI: 29.8    T: 98.1 F (oral);  BP: 130/83 mm Hg (left arm, sitting);  P: 91 bpm (left arm (BP Cuff), sitting);  sCr: 0.78 mg/dL;  GFR: 91.25        Exams:     PHYSICAL EXAM:     GENERAL: vital signs recorded - well developed, well nourished;  no apparent distress;     NECK: range of motion is normal;     RESPIRATORY: normal appearance and symmetric expansion of chest wall; normal respiratory rate and pattern with no distress; normal breath sounds with no rales, rhonchi, wheezes or rubs;     CARDIOVASCULAR: normal rate; rhythm is regular;  no edema;     GASTROINTESTINAL: nontender; normal bowel sounds; no organomegaly;     GENITOURINARY: Pap smear taken;  external genitalia: normal without lesions or urethral abnormalities;;  vagina: normal with good pelvic support and no lesions or discharge;; cervix: white, non-odorous cervical discharge noted;     LYMPHATIC: no axillary adenopathy;     BREAST/INTEGUMENT: breast exam: thickened skin over the left lower medial quadrant and right lower medial quadrant;  no breast masses;     MUSCULOSKELETAL: normal gait; normal range of motion of all major muscle groups; no limb or joint pain with range of motion;     NEUROLOGIC: mental status: alert and oriented x 3;     PSYCHIATRIC: appropriate affect and demeanor; normal speech pattern; normal thought and perception;         ASSESSMENT           V79.0   Z13.89  Screening for depression              DDx:     V72.31   Z01.419  Routine gynecological examination              DDx:         ORDERS:         Lab Orders:       [id number]  Cytopathology, slides, cervical or vaginal; manual screening under MD supervision  (Send-Out)           Procedures Ordered:       [id number]  Handlg&/or convey of spec for TR office to lab  (In-House)           Other Orders:         Depression screen negative  (In-House)           Negative EtOH screen   (In-House)                   PLAN:          Screening for depression     MIPS Positive Depression Screen but after further evaluation the patient does not have a diagnosis of depression.  Negative alcohol screen           Orders:         Depression screen negative  (In-House)           Negative EtOH screen  (In-House)            Routine gynecological examination           Orders:       [id number]  Cytopathology, slides, cervical or vaginal; manual screening under MD supervision  (Send-Out)         30522  Handlg&/or convey of spec for TR office to lab  (In-House)               CHARGE CAPTURE           **Please note: ICD descriptions below are intended for billing purposes only and may not represent clinical diagnoses**        Primary Diagnosis:         V79.0 Screening for depression            Z13.89    Encounter for screening for other disorder              Orders:          [id number]   Preventive medicine, established patient, age 40-64 years  (In-House)                Depression screen negative  (In-House)                Negative EtOH screen  (In-House)           V72.31 Routine gynecological examination            Z01.419    Encounter for gynecological examination (general) (routine) without abnormal findings              Orders:          71374   Handlg&/or convey of spec for TR office to lab  (In-House)

## 2021-05-18 NOTE — PROGRESS NOTES
Woody Damon 1965     Preventive Medicine Services    Visit Date: Tue, May 21, 2019 05:51 pm    Provider: Christina Romero N.P. (Assistant: Sarah Spurling, MA)    Location: [facility]        Electronically signed by Christina Romero N.P. on  05/21/2019 06:54:16 PM                             SUBJECTIVE:        CC:     Ms. Damon is a 53 year old White female.  This is a follow-up visit.  Well check. - No issues w vision, goes to the eye doc regulary.;         HPI:         Ms. Damon presents with health checkup.  She cannot recall when she last had a physical exam.  Her last menstrual period was 05/21/19.  She is not currently using any form of contraception.  She performs breast self-exams occasionally.  She is current with her Td, MMR, hepatitis B and influenza immunization.  Ms. Damon denies any history of abnormal Pap smears.          PHQ-9 Depression Screening: Completed form scanned and in chart; Total Score 5 Alcohol Consumption Screening: Completed form scanned and in chart; Total Score 0     was recently started on Lisinopril - stopped taking due to thought may have caused her sciatica to flare - only took for 5 days.  She did not hear from our office as to what she was supposed to do after that     ROS:     CONSTITUTIONAL:  Positive for fatigue.   Negative for chills, unintentional weight gain or unintentional weight loss.      EYES:  Positive for use of contact lenses.   Negative for blurred vision.      E/N/T:  Negative for diminished hearing, use of dentures and sore throat.      CARDIOVASCULAR:  Negative for chest pain and pedal edema.      RESPIRATORY:  Negative for recent cough, dyspnea and frequent wheezing.      GASTROINTESTINAL:  Positive for IBS with diarrhea.   Negative for abdominal pain, acid reflux symptoms, constipation, diarrhea, heartburn, nausea or vomiting.      GENITOURINARY:  Positive for urinary incontinence stress incontinence.   Negative for dysuria, high  risk sexual behavior, history of recurrent bacterial vaginosis or change in urine stream.      MUSCULOSKELETAL:  Negative for arthralgias and myalgias.      INTEGUMENTARY/BREAST:  Positive for cracking on the left pinky.   Negative for pruritis or rash.      NEUROLOGICAL:  Positive for headaches ( typically with periods ).   Negative for dizziness or paresthesias.      ENDOCRINE:  Positive for hot flashes (hot flashes at night).   Negative for polydipsia or polyphagia.      ALLERGIC/IMMUNOLOGIC:  Negative for seasonal allergies and perennial allergies.      PSYCHIATRIC:  Positive for trouble staying asleep.   Negative for anxiety, crying spells, depression, feelings of stress, anhedonia, mood swings, personality change, premenstrual tension syndrome, difficulty concentrating, recreational drug use, sadness or suicidal thoughts.          PMH/FMH/SH:     Last Reviewed on 2019 06:00 PM by Christina Romero    Past Medical History:             PREVENTIVE HEALTH MAINTENANCE             BONE DENSITY: has never been done     DENTAL CLEANING: was last done  - [facility]     EYE EXAM: was last done  - Dr. 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  Non-drinker      Caffeine:  She admits to consuming caffeine via tea.          Substance Abuse History:     Last Reviewed on 5/21/2019 06:00 PM by Christina Romero    None         Mental Health History:     Last Reviewed on 5/21/2019 06:00 PM by Christina Romero    NEGATIVE         Communicable Diseases (eg STDs):     Last Reviewed on 5/21/2019 06:00 PM by Christina Romero    Reportable health conditions; NEGATIVE             Current Problems:     Last Reviewed on 5/21/2019 06:00 PM by Christina Romero    Screening for cholesterol level     Fatigue     High blood pressure     Screening mammogram - other     Screening for depression         Immunizations:     zzFluzone pf-quadrivalent 3 and up 10/1/2018         Allergies:     Last Reviewed on 5/21/2019 06:00 PM by Christina Romero      No Known Drug Allergies.         Current Medications:     Last Reviewed on 5/21/2019 06:03 PM by Christina Romero    Aspirin (ASA) 81mg Tablets, Enteric Coated 1 tab daily     Lisinopril 20mg Tablet 1 tab daily         OBJECTIVE:        Vitals:         Current: 5/21/2019 5:57:46 PM    Ht:  5 ft, 6 in;  Wt: 186.8 lbs;  BMI: 30.2    T: 98.4 F (oral);  BP: 148/87 mm Hg (left arm, sitting);  P: 85 bpm (left arm (BP Cuff), sitting);  sCr: 0.78 mg/dL;  GFR: 91.75        Repeat:     5:59:44 PM     BP:   147/90mm Hg (right arm, sitting, second take.)         Exams:     PHYSICAL EXAM:     GENERAL: vital signs recorded - well developed, well nourished;  no apparent distress;     EYES: extraocular movements intact; PERRL;     E/N/T: EARS: external auditory canal normal;  bilateral TMs are normal;  NOSE:  normal nasal mucosa, septum, turbinates, and sinuses; OROPHARYNX:  normal mucosa, dentition, gingiva, and posterior pharynx;     NECK: range of motion is normal;     RESPIRATORY: normal appearance and symmetric expansion of chest wall; normal respiratory rate and pattern with no distress; normal breath sounds with no rales, rhonchi, wheezes or rubs;      CARDIOVASCULAR: normal rate; rhythm is regular;  no edema;     GASTROINTESTINAL: nontender; normal bowel sounds; no organomegaly;     LYMPHATIC: no enlargement of cervical or facial nodes; no supraclavicular nodes;     MUSCULOSKELETAL: normal gait; normal range of motion of all major muscle groups; no limb or joint pain with range of motion;     NEUROLOGIC: mental status: alert and oriented x 3;     PSYCHIATRIC: appropriate affect and demeanor; normal speech pattern; normal thought and perception;         ASSESSMENT           V70.0   Z00.00  Health checkup              DDx:     V79.0   Z13.89  Screening for depression              DDx:     V76.12   Z12.31  Screening mammogram - other              DDx:     V76.2   Z12.4  Screening for cervical cancer              DDx:     401.1   I10  High blood pressure              DDx:         ORDERS:         Radiology/Test Orders:       [id number]  Screening mammography, bilateral (2-view film study of each breast)  (Send-Out)           Lab Orders:       [id number]  [facility] PHYSICAL: CMP, CBC, TSH, LIPID: [id number], [id number], [id number], [id number]  (Send-Out)           Other Orders:         Depression screen negative  (In-House)         1101F  Pt screen for fall risk; document no falls in past year or only 1 fall w/o injury in past year (JOE)  (In-House)           Negative EtOH screen  (In-House)                   PLAN:          Health checkup     LABORATORY:  Labs ordered to be performed today include PHYSICAL PANEL; CMP, CBC, TSH, LIPID.  MIPS Positive Depression Screen but after further evaluation the patient does not have a diagnosis of depression.  Negative alcohol screen           Orders:       [id number]  [facility] PHYSICAL: CMP, CBC, TSH, LIPID: [id number], [id number], [id number], [id number]  (Send-Out)           Depression screen negative  (In-House)         1101F  Pt screen for fall risk; document no falls in past year or only 1 fall w/o injury in past year (JOE)  (In-House)           Negative  EtOH screen  (In-House)            Screening for depression as above          Screening mammogram - other         FOLLOW-UP TESTING #1:    RADIOLOGY:  I have ordered Mammogram Screening to be done today.            Orders:       [id number]  Screening mammography, bilateral (2-view film study of each breast)  (Send-Out)            Screening for cervical cancer will set up pap on the same day as mammogram          High blood pressure Will have her start back on the lisinopril - if the pain starts back, she will stop and go  the Losartan.  I have already sent this to the pharmacy - she will call and let us know what medication she will be taking and have her come back in for a free blood pressure check in the next month             CHARGE CAPTURE           **Please note: ICD descriptions below are intended for billing purposes only and may not represent clinical diagnoses**        Primary Diagnosis:         V70.0 Health checkup            Z00.00    Encounter for general adult medical examination without abnormal findings              Orders:          31776   Preventive medicine, established patient, age 40-64 years  (In-House)                Depression screen negative  (In-House)             1101F   Pt screen for fall risk; document no falls in past year or only 1 fall w/o injury in past year (JOE)  (In-House)                Negative EtOH screen  (In-House)           V79.0 Screening for depression            Z13.89    Encounter for screening for other disorder    V76.12 Screening mammogram - other            Z12.31    Encounter for screening mammogram for malignant neoplasm of breast    V76.2 Screening for cervical cancer            Z12.4    Encounter for screening for malignant neoplasm of cervix    401.1 High blood pressure            I10    Essential (primary) hypertension

## 2021-05-18 NOTE — PROGRESS NOTES
Jae DamonEileen 1965     Office/Outpatient Visit    Visit Date:  05:54 pm    Provider: Christina Romero N.P. (Assistant: Sarah Spurling, MA)    Location: [facility]        Electronically signed by Christina Romero N.P. on  2019 10:29:53 PM                             SUBJECTIVE:        CC:     Ms. Damon is a 53 year old White female.  High BP, been having headaches.;         HPI:         Complaint of elevated blood pressure affecting a procedure..  The symptom began years ago.  The typical duration of an episode is the majority of the day.  Associated symptoms include headache and lightheaded.  has been having some elevation in blood pressures at home - as high as 150-160s/;  family history of cardiac disease     ROS:     CONSTITUTIONAL:  Negative for chills, fatigue, fever, and weight change.      CARDIOVASCULAR:  Negative for chest pain, orthopnea, paroxysmal nocturnal dyspnea and pedal edema.      RESPIRATORY:  Negative for dyspnea.      NEUROLOGICAL:  Positive for dizziness and headaches.      PSYCHIATRIC:  Negative for anxiety, depression, and sleep disturbances.          PMH/FMH/SH:     Last Reviewed on 10/05/2016 11:46 AM by Jermaine Curtis    Past Medical History:             PREVENTIVE HEALTH MAINTENANCE             COLONOSCOPY: has never been done     MAMMOGRAM: was last done 10/2015 with normal results [facility] [address]     PAP SMEAR: was last done  with normal results     INFLUENZA VACCINE: has never been done         Surgical History:          section: X 1; ;         Family History:     Father: Carotid Artery Stenosis; Hypertension; Hyperlipidemia; Myocardial Infarction;  Type 2 Diabetes;  age 77 in      Mother: Hypertension;  age 75 in 2016     Brother(s): # brothers total 3- all healthy brother(s) total     Sister(s): # sisters total 1 with HTN sister(s) total         Social History:     Occupation: Occupational  therapist;     Marital Status:      Children: 3 children         Tobacco/Alcohol/Supplements:     Last Reviewed on 10/05/2016 11:46 AM by Jermaine Curtis    Tobacco: She has never smoked.          Substance Abuse History:     Last Reviewed on 10/05/2016 11:46 AM by Jermaine Curtis        Mental Health History:     Last Reviewed on 10/05/2016 11:46 AM by Jermaine Curtis        Communicable Diseases (eg STDs):     Last Reviewed on 10/05/2016 11:46 AM by Jermaine Curtis            Current Problems:     Last Reviewed on 10/05/2016 11:46 AM by Jermaine Curtis    Screening for cholesterol level     Fatigue     High blood pressure         Immunizations:     None        Allergies:     Last Reviewed on 10/05/2016 11:46 AM by Jermaine Curtis      No Known Drug Allergies.         Current Medications:     Last Reviewed on 10/05/2016 11:46 AM by Jermaine Curtis    None        OBJECTIVE:        Vitals:         Historical:     10/05/2016  BP:   130/86 mm Hg    10/05/2016  BP:   136/86 mm Hg ( (left arm, , sitting, );)         Current: 4/23/2019 5:58:28 PM    Ht:  5 ft, 6 in;  Wt: 187 lbs;  BMI: 30.2    T: 97.7 F (oral);  BP: 147/96 mm Hg (left arm, sitting);  P: 76 bpm (left arm (BP Cuff), sitting);  sCr: 0.78 mg/dL;  GFR: 91.79        Repeat:     5:59:23 PM     BP:   149/82mm Hg (right arm, sitting, second take.)         Exams:     PHYSICAL EXAM:     GENERAL: vital signs recorded - well developed, well nourished;  no apparent distress;     NECK: range of motion is normal; thyroid is non-palpable;     RESPIRATORY: normal respiratory rate and pattern with no distress; normal breath sounds with no rales, rhonchi, wheezes or rubs;     CARDIOVASCULAR: normal rate; rhythm is regular;  no systolic murmur; no edema;     MUSCULOSKELETAL: normal gait; normal range of motion of all major muscle groups; no limb or joint pain with range of motion;     NEUROLOGICAL:  cranial nerves, motor and sensory function,  reflexes, gait and coordination are all intact;     PSYCHIATRIC:  appropriate affect and demeanor; normal speech pattern; grossly normal memory;         ASSESSMENT:           796.2   R03.0  Elevated blood pressure without a diagnosis of hypertension              DDx:         ORDERS:         Meds Prescribed:       Lisinopril 20mg Tablet 1 tab daily  #30 (Thirty) tablet(s) Refills: 2                 PLAN:          Elevated blood pressure without a diagnosis of hypertensionLeeAnn will follow up in 2-4 weeks.  We will need to check her labs and make sure that her cholesterol and other levels are all ok - Her next visit will be a wellness and we will discuss screening needs at that time.           Prescriptions:       Lisinopril 20mg Tablet 1 tab daily  #30 (Thirty) tablet(s) Refills: 2             CHARGE CAPTURE:           Primary Diagnosis:     796.2 Elevated blood pressure without a diagnosis of hypertension            R03.0    Elevated blood-pressure reading, without diagnosis of hypertension              Orders:          68922   Office/outpatient visit; established patient, level 3  (In-House)

## 2021-05-18 NOTE — PROGRESS NOTES
Chiquita Damon  1965     Office/Outpatient Visit    Visit Date: Tue, Dec 22, 2020 12:50 pm    Provider: Christina Romero N.P. (Assistant: Patricia Toledo MA)    Location: [facility]        Electronically signed by Christina Romero N.P. on  12/27/2020 09:51:40 AM                             Subjective:        CC: Mrs. Damon is a 55 year old White female.  This is a follow-up visit.  check on bp;         HPI:           Mrs. Damon presents with essential (primary) hypertension.  This was first diagnosed several years ago.  Her current cardiac medication regimen includes an angiotensin receptor blocker.  Compliance with treatment has been good.  She is tolerating the medication well without side effects.  150/90 typical on most day - no symptoms  with these numbersShkenrick did not bring her blood pressure diary, but says that pressures have been too high.  Chiquita would not like to change/add additional medication, would like to try to make changes to see if will help.  She is currently under a lot of stress.  Recently diagnosed with breast cancer, had a lumpectomy and will be going through radiation next month.            Complaint of malignant neoplasm of upper-inner quadrant of right female breast..  Lumpectomy with breast reduction 11/2020  - plans for radiation at [facility] at [facility] scheduled for January with Dr. Hemphill     ROS:     CONSTITUTIONAL:  Negative for chills, fatigue and fever.      CARDIOVASCULAR:  Negative for chest pain and pedal edema.      RESPIRATORY:  Negative for recent cough and dyspnea.      GASTROINTESTINAL:  Negative for abdominal pain, constipation, diarrhea, heartburn, nausea and vomiting.      GENITOURINARY:  Negative for dysuria and change in urine stream.      MUSCULOSKELETAL:  Negative for arthralgias and myalgias.      INTEGUMENTARY/BREAST:  Negative for pruritis and rash.      NEUROLOGICAL:  Negative for dizziness, fainting, headaches and paresthesias.       ENDOCRINE:  Negative for hair loss, polydipsia and polyphagia.      ALLERGIC/IMMUNOLOGIC:  Negative for seasonal allergies.      PSYCHIATRIC:  Negative for anxiety, depression and suicidal thoughts.          Past Medical History / Family History / Social History:         Last Reviewed on 2020 08:59 AM by Christina Romero    Past Medical History:             PREVENTIVE HEALTH MAINTENANCE             BONE DENSITY: has never been done     DENTAL CLEANING: was last done  - [facility]     EYE EXAM: was last done  - [place]     Hepatitis C Medicare Screening: never     MAMMOGRAM: was last done 2020 with the following abnormalities noted-- left breast mass - malignant     PAP SMEAR: was last done  with normal results The next one is due           Surgical History:     Other Surgeries     section: X ; ;     right breast cancer lumpectomy with lymph node removal - 2020;    bilateral breast reduction  2020;         Family History:     Father: Carotid Artery Stenosis; Hypertension; Hyperlipidemia; Myocardial Infarction;  Type 2 Diabetes;  age 77 in      Mother: Hypertension;  age 75 in      Brother(s): # brothers total 3- all healthy brother(s) total     Sister(s): # sisters total 1 with HTN sister(s) total         Social History:     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  Non-drinker     Caffeine:  She admits to consuming caffeine via tea.          Substance Abuse History:     Last Reviewed on 2020 08:59 AM by Christina Romero    None         Mental Health History:     Last Reviewed on 2020 08:59 AM by Christina Romero    NEGATIVE         Communicable Diseases (eg STDs):     Last Reviewed on 2020 08:59 AM by Christina Romero    Reportable health  conditions; NEGATIVE         Current Problems:     Last Reviewed on 7/08/2020 08:59 AM by Christina Romero    Essential (primary) hypertension    Other fatigue    Encounter for screening for depression    Other specified menopausal and perimenopausal disorders    Estrogen receptor positive status [ER+] (right breast cancer)    Malignant neoplasm of upper-inner quadrant of right female breast        Immunizations:     zzFluzone pf-quadrivalent 3 and up 10/1/2018        Allergies:     Last Reviewed on 12/22/2020 12:50 PM by Patricia Toledo    lisinopril:   (Adverse Reaction)        Current Medications:     Last Reviewed on 12/22/2020 12:50 PM by Patricia Toledo    aspirin 81 mg oral tablet, delayed release (enteric coated) [1 tab daily]    losartan 50 mg oral tablet [Take 1 tablet by mouth twice daily.]        Objective:        Vitals:         Current: 12/22/2020 12:56:29 PM    Ht:  5 ft, 6 in;  Wt: 191.2 lbs;  BMI: 30.9T: 97.1 F (oral);  BP: 135/80 mm Hg (left arm, sitting);  P: 83 bpm (left arm (BP Cuff), sitting);  sCr: 0.86 mg/dL;  GFR: 82.14        Exams:     PHYSICAL EXAM:     GENERAL: vital signs recorded - well developed, well nourished;  no apparent distress;     EYES: extraocular movements intact; PERRL;     E/N/T: EARS: external auditory canal normal;  bilateral TMs are normal;  NOSE:  normal nasal mucosa, septum, turbinates, and sinuses; OROPHARYNX:  normal mucosa, dentition, gingiva, and posterior pharynx;     NECK: range of motion is normal;     RESPIRATORY: normal appearance and symmetric expansion of chest wall; normal respiratory rate and pattern with no distress; normal breath sounds with no rales, rhonchi, wheezes or rubs;     CARDIOVASCULAR: normal rate; rhythm is regular;  no edema;     GASTROINTESTINAL: nontender; normal bowel sounds; no organomegaly;     LYMPHATIC: no enlargement of cervical or facial nodes; no supraclavicular nodes;     MUSCULOSKELETAL: normal gait; normal range of motion of all  major muscle groups; no limb or joint pain with range of motion;     NEUROLOGIC: mental status: alert and oriented x 3;     PSYCHIATRIC: appropriate affect and demeanor; normal speech pattern; normal thought and perception;         Assessment:         I10   Essential (primary) hypertension       C50.211   Malignant neoplasm of upper-inner quadrant of right female breast           Plan:         Essential (primary) hypertension        RECOMMENDATIONS given include: Jae will try to cut back on sodium, caffeine - I do feel that if her BP remains at this level, we should add additional medicaiton should she not be able to make lifestyle adjustments enough to changes her numbers.  She will follow up in 4-6 weeks and bring in a BP log.  We will add on amlodipine should her numbers not improve.          Malignant neoplasm of upper-inner quadrant of right female breastWe will have her follow up in the office in 3 months after her radiation treatment complete.  She will follow up sooner should she have any problems or concerns            Patient Recommendations:        For  Essential (primary) hypertension:    I also recommend Jae will try to cut back on sodium, caffeine - I do feel that if her BP remains at this level, we should add additional medicaiton should she not be able to make lifestyle adjustments enough to changes her numbers.  She will follow up in 4-6 weeks and bring in a BP log.  We will add on amlodipine should her numbers not improve.              Charge Capture:         Primary Diagnosis:     I10  Essential (primary) hypertension           Orders:      [id number]  Office/outpatient visit; established patient, level 4  (In-House)              C50.211  Malignant neoplasm of upper-inner quadrant of right female breast

## 2021-05-18 NOTE — PROGRESS NOTES
Chiquita Damon  1965     Office/Outpatient Visit    Visit Date: Fri, Dec 6, 2019 09:27 am    Provider: Christina Romero N.P. (Assistant: Patricia Toledo MA)    Location: [facility]        Electronically signed by Christina Romero N.P. on  12/06/2019 05:07:09 PM                             Subjective:        CC: Ms. Damon is a 54 year old White female.  This is a follow-up visit.  med refill;         HPI:           Patient to be evaluated for essential (primary) hypertension.  This was first diagnosed several months ago.  She is not using any nonpharmacologic treatment modalities.  Her current cardiac medication regimen includes an ACE inhibitor ( Linisopril ).  She has been experiencing possible adverse medication effects, including cough.  would like to switch to Losartan due to cough                  Ms. Damon presents with a solitary skin tag located on the left cheek.  She describes the lesion as flesh-colored.  She first became aware of it many years ago.  off and on for years - she sometimes scrapes it off accidentally - returns every time Wants to make sure not something concerning         Ms. Damon presents with a solitary spot that won't heal located on the left lower back.  She first became aware of it years ago.  The lesion tends to come and go.  Associated symptoms include itches,gets caught on bra, bleeds on occasion.      ROS:     CONSTITUTIONAL:  Negative for chills, fatigue, fever, and weight change.      EYES:  Negative for blurred vision.      CARDIOVASCULAR:  Negative for chest pain, orthopnea, paroxysmal nocturnal dyspnea and pedal edema.      RESPIRATORY:  Positive for recent cough.   Negative for dyspnea.      INTEGUMENTARY/BREAST:  Positive for skin tag on face and spots on back - one in particular that wants looked at.          Past Medical History / Family History / Social History:         Last Reviewed on 6/03/2019 02:44 PM by Christina Romero    Past Medical  History:             PREVENTIVE HEALTH MAINTENANCE             BONE DENSITY: has never been done     DENTAL CLEANING: was last done  - [facility]     EYE EXAM: was last done  - Dr. 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  Non-drinker     Caffeine:  She admits to consuming caffeine via tea.          Substance Abuse History:     Last Reviewed on 2019 06:00 PM by Christina Romero    None         Mental Health History:     Last Reviewed on 2019 06:00 PM by Christina Romero    NEGATIVE         Communicable Diseases (eg STDs):     Last Reviewed on 2019 06:00 PM by Christina Romero    Reportable health conditions; NEGATIVE         Current Problems:     Last Reviewed on 2019 02:44 PM by Christina Romero    Essential (primary) hypertension    Other fatigue    Encounter for screening for lipoid disorders    Fatigue    High blood pressure    Screening for cholesterol level    Screening for depression    Encounter for screening for other disorder        Immunizations:     zzFluzone pf-quadrivalent 3 and up 10/1/2018        Allergies:     Last Reviewed on 2019 09:21 AM by Spurling, Sarah C    No Known Allergies.        Current Medications:      Last Reviewed on 6/03/2019 09:21 AM by Spurling, Sarah C    Lisinopril 40mg Tablet [1 tab daily]    Aspirin (ASA) 81mg Tablets, Enteric Coated [1 tab daily]        Objective:        Vitals:         Historical:     6/3/2019  BP:   130/83 mm Hg ( (left arm, , sitting, );) 5/21/2019  BP:   148/87 mm Hg ( (left arm, , sitting, );) 4/23/2019  BP:   149/82 mm Hg ( (right arm, , sitting, );)     Current: 12/6/2019 9:31:30 AM    Ht:  5 ft, 6 in;  Wt: 186.4 lbs;  BMI: 30.1T: 98 F (oral);  BP: 122/78 mm Hg (left arm, sitting);  P: 76 bpm (left arm (BP Cuff), sitting);  sCr: 0.78 mg/dL;  GFR: 90.63        Exams:     PHYSICAL EXAM:     GENERAL: vital signs recorded - well developed, well nourished;  no apparent distress;     NECK: range of motion is normal; thyroid is non-palpable;     RESPIRATORY: normal respiratory rate and pattern with no distress; normal breath sounds with no rales, rhonchi, wheezes or rubs;     CARDIOVASCULAR: normal rate; rhythm is regular;  no systolic murmur; no edema;     NEUROLOGICAL:  cranial nerves, motor and sensory function, reflexes, gait and coordination are all intact;     PSYCHIATRIC:  appropriate affect and demeanor; normal speech pattern; grossly normal memory;         Assessment:         I10   Essential (primary) hypertension       L91.8   Other hypertrophic disorders of the skin           ORDERS:         Meds Prescribed:       [New Rx] losartan 50 mg oral tablet [take 1 tablet (50 mg) by oral route daily], #90 (ninety) tablets, Refills: 1 (one)         Procedures Ordered:       REFER  Referral to Specialist or Other Facility  (Send-Out)                      Plan:         Essential (primary) hypertension          Prescriptions:       [New Rx] losartan 50 mg oral tablet [take 1 tablet (50 mg) by oral route daily], #90 (ninety) tablets, Refills: 1 (one)         Other hypertrophic disorders of the skin        REFERRALS:  Referral initiated to a dermatologist ( Dr. Cassi Salas ).             Orders:       REFER  Referral to Specialist or Other Facility  (Send-Out)                  Charge Capture:         Primary Diagnosis:     I10  Essential (primary) hypertension           Orders:      [id number]  Office/outpatient visit; established patient, level 3  (In-House)              L91.8  Other hypertrophic disorders of the skin

## 2021-05-18 NOTE — PROGRESS NOTES
Chiquita Damon  1965     Office/Outpatient Visit    Visit Date: Wed, Jul 8, 2020 08:43 am    Provider: Christina Romero N.P. (Assistant: Gita Bai LPN)    Location: [facility]        Electronically signed by Christina Romero N.P. on  07/08/2020 09:35:41 AM                             Subjective:        CC: Ms. Damon is a 54 year old White female.  This is a follow-up visit.  check up / refills;         HPI:           Encounter for general adult medical examination without abnormal findings details; her last physical exam was 1 year ago.  Her last menstrual period was June.  She performs breast self-exams monthly.   Her last Pap smear was 1 year ago.   Her last mammogram was 1 year ago.   She has never had a dexa scan. She's had vision screening done <6 months ago.   Preventative Health updated today.  She is current with her influenza immunization.  Ms. Damon denies any history of abnormal Pap smears.            Patient presents with essential (primary) hypertension.  This was first diagnosed several months ago.  Current nonpharmacologic treatment includes low sodium diet.  Her current cardiac medication regimen includes an angiotensin receptor blocker ( Losartan BID ).  Compliance with treatment has been good.  She is tolerating the medication well without side effects.  She did not bring her blood pressure diary, but says that typical readings show occasional increase, but typically comperable to todays reading.      ROS:     CONSTITUTIONAL:  Positive for fatigue.      EYES:  Negative for blurred vision.      E/N/T:  Negative for ear pain and sinus pressure.      CARDIOVASCULAR:  Negative for chest pain and pedal edema.      GENITOURINARY:  Positive for irregular menstrual cycle.      HEMATOLOGIC/LYMPHATIC:  Negative for easy bruising and excessive bleeding.      MUSCULOSKELETAL:  Positive for last 6 months  gabriela horses.      NEUROLOGICAL:  Positive for headaches (  "\"sinus\" ).   Negative for memory loss.      PSYCHIATRIC:  Negative for anxiety, depression, sleep disturbance and suicidal thoughts ( (frequent naps) ).          Past Medical History / Family History / Social History:         Last Reviewed on 2020 08:59 AM by Christina Romero    Past Medical History:             PREVENTIVE HEALTH MAINTENANCE             BONE DENSITY: has never been done     DENTAL CLEANING: was last done  - [facility]     EYE EXAM: was last done  - [facility]     Hepatitis C Medicare Screening: never     MAMMOGRAM: was last done 10/2015 with normal results [facility] [address] [facility] [place]     PAP SMEAR: was last done  with normal results The next one is due           Surgical History:          section: X 1; ;         Family History:     Father: Carotid Artery Stenosis; Hypertension; Hyperlipidemia; Myocardial Infarction;  Type 2 Diabetes;  age 77 in      Mother: Hypertension;  age 75 in      Brother(s): # brothers total 3- all healthy brother(s) total     Sister(s): # sisters total 1 with HTN sister(s) total         Social History:     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  Non-drinker     Caffeine:  She admits to consuming caffeine via tea.          Substance Abuse History:     Last Reviewed on 2020 08:59 AM by Christina Romero    None         Mental Health History:     Last Reviewed on 2020 08:59 AM by Christina Romero    NEGATIVE         Communicable Diseases (eg STDs):     Last Reviewed on 2020 08:59 AM by Christina Romero    Reportable health conditions; NEGATIVE         Current Problems:     Last Reviewed on 2020 08:59 AM by Christina Romero    Other fatigue    Essential (primary) hypertension    Encounter for screening for " depression    Solitary cyst of right breast    Encounter for general adult medical examination without abnormal findings    Encounter for screening for other viral diseases    Encounter for other screening for malignant neoplasm of breast        Immunizations:     zzFluzone pf-quadrivalent 3 and up 10/1/2018        Allergies:     Last Reviewed on 7/08/2020 08:59 AM by Christina Romero    lisinopril:   (Adverse Reaction)        Current Medications:     Last Reviewed on 7/08/2020 08:59 AM by Christina Romero    aspirin 81 mg oral tablet, delayed release (enteric coated) [1 tab daily]    losartan 50 mg oral tablet [Take 1 tablet by mouth twice daily.]        Objective:        Vitals:         Current: 7/8/2020 8:45:34 AM    Ht:  5 ft, 6 in;  Wt: 192.2 lbs;  BMI: 31.0T: 96.7 F (oral);  BP: 124/79 mm Hg (left arm, sitting);  P: 82 bpm (left arm (BP Cuff), sitting);  sCr: 0.78 mg/dL;  GFR: 91.82        Exams:     PHYSICAL EXAM:     GENERAL: vital signs recorded - well developed, well nourished;  no apparent distress;     EYES:  EOMI; PERRLA; normal lids, conjunctiva, and fundoscopic exam;     E/N/T:  normal EACs, TMs, nasal/oral mucosa, teeth, gingiva, and oropharynx;     NECK: range of motion is normal; thyroid is non-palpable;     RESPIRATORY: normal appearance and symmetric expansion of chest wall; normal respiratory rate and pattern with no distress; normal breath sounds with no rales, rhonchi, wheezes or rubs;     CARDIOVASCULAR: normal rate; rhythm is regular;  no edema;     GASTROINTESTINAL: nontender, nondistended; no hepatosplenomegaly or masses; no bruits;     LYMPHATICS:  no adenopathy in cervical, supraclavicular, axillary, or inguinal regions;     MUSCULOSKELETAL: normal gait; normal range of motion of all major muscle groups; no limb or joint pain with range of motion;     NEUROLOGIC: mental status: alert and oriented x 3;     PSYCHIATRIC: appropriate affect and demeanor; normal speech pattern; normal  thought and perception;         Assessment:         Z00.00   Encounter for general adult medical examination without abnormal findings       I10   Essential (primary) hypertension       Z12.39   Encounter for other screening for malignant neoplasm of breast       Z11.59   Encounter for screening for other viral diseases       R53.83   Other fatigue       N95.8   Other specified menopausal and perimenopausal disorders           ORDERS:         Meds Prescribed:       [Refilled] losartan 50 mg oral tablet [Take 1 tablet by mouth twice daily.], #60 (sixty) tablets, Refills: 6 (six)         Radiology/Test Orders:       [id number]  Screening mammography, bilateral (2-view film study of each breast)  (Send-Out)              Lab Orders:       FUTURE  Future order to be done at patients convenience  (Send-Out)            [id number]  [facility] PHYSICAL: CMP, CBC, TSH, LIPID: [id number], [id number], [id number], [id number]  (Send-Out)            FUTURE  Future order to be done at patients convenience  (Send-Out)            [id number]  [facility] Hepatitis C antibody  (Send-Out)                      Plan:         Encounter for general adult medical examination without abnormal findings        FOLLOW-UP TESTING #1: FOLLOW-UP LABORATORY:  Labs to be scheduled in the future include PHYSICAL PANEL; CMP, CBC, TSH, LIPID.            Orders:       FUTURE  Future order to be done at patients convenience  (Send-Out)            [id number]  [facility] PHYSICAL: CMP, CBC, TSH, LIPID: [id number], [id number], [id number], [id number]  (Send-Out)              Essential (primary) hypertension          Prescriptions:       [Refilled] losartan 50 mg oral tablet [Take 1 tablet by mouth twice daily.], #60 (sixty) tablets, Refills: 6 (six)         Encounter for other screening for malignant neoplasm of breast        FOLLOW-UP TESTING #1:    RADIOLOGY:  I have ordered Mammogram Screening to be done today.            Orders:       [id number]  Screening mammography, bilateral (2-view film study of each breast)   (Send-Out)              Encounter for screening for other viral diseases        FOLLOW-UP TESTING #1: FOLLOW-UP LABORATORY:  Labs to be scheduled in the future include Hepatitis C  Screen Antibody.            Orders:       FUTURE  Future order to be done at patients convenience  (Send-Out)            [id number]  [facility]  (Send-Out)              Other fatigueWill consider adding B12 if labs warrant - maybe cause of her cramping - would recommend taht she increase her fluid intake as well, increase potassium intake(bananas, leafy greens, etc)        Other specified menopausal and perimenopausal disordersconsider cause of fatigue/dryness - discussed oral estrogen - She will consider  She was inquiring about implanted hormones based on lab testing - I did recommend that she get with a GYN as this is not something that our office specializes in - She will follow up if wishes to pursue further treatment            Patient Recommendations:        For  Encounter for general adult medical examination without abnormal findings:            The following laboratory testing has been ordered:         For  Encounter for screening for other viral diseases:            The following laboratory testing has been ordered:             Charge Capture:         Primary Diagnosis:     Z00.00  Encounter for general adult medical examination without abnormal findings           Orders:      [id number]  Preventive medicine, established patient, age 40-64 years  (In-House)              I10  Essential (primary) hypertension     Z12.39  Encounter for other screening for malignant neoplasm of breast     Z11.59  Encounter for screening for other viral diseases     R53.83  Other fatigue     N95.8  Other specified menopausal and perimenopausal disorders

## 2021-05-18 NOTE — PROGRESS NOTES
Chiquita Damon  1965     Office/Outpatient Visit    Visit Date:  02:13 pm    Provider: Christina Romero N.P. (Assistant: Spurling, Sarah C, MA)    Location: [facility]        Electronically signed by Christina Romero N.P. on  2020 11:48:58 PM                             Subjective:        CC: Ms. Damon is a 54 year old White female.  This is a follow-up visit.          HPI:           Dx with essential (primary) hypertension; this was first diagnosed several years ago.  Her current cardiac medication regimen includes an ACE inhibitor.  Compliance with treatment has been good.  She is tolerating the medication well without side effects.  Review of her blood pressure log reveals systolics in the 130s and diastolics in the 80-90 range.      ROS:     CONSTITUTIONAL:  Negative for chills and fever.      E/N/T:  Positive for sinus pressure.   Negative for diminished hearing or nasal congestion.      CARDIOVASCULAR:  Negative for chest pain and palpitations.      RESPIRATORY:  Negative for recent cough and dyspnea.      MUSCULOSKELETAL:  Negative for arthralgias and myalgias.      INTEGUMENTARY/BREAST:  Negative for atypical mole(s) and rash.      NEUROLOGICAL:  Negative for paresthesias and weakness.      PSYCHIATRIC:  Negative for anxiety and depression.          Past Medical History / Family History / Social History:         Last Reviewed on 2019 02:44 PM by Christina Romero    Past Medical History:             PREVENTIVE HEALTH MAINTENANCE             BONE DENSITY: has never been done     DENTAL CLEANING: was last done  - [facility]     EYE EXAM: was last done  - Dr. 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  Non-drinker     Caffeine:  She admits to consuming caffeine via tea.          Substance Abuse History:     Last Reviewed on 5/21/2019 06:00 PM by Christina Romero    None         Mental Health History:     Last Reviewed on 5/21/2019 06:00 PM by Christina Romero    NEGATIVE         Communicable Diseases (eg STDs):     Last Reviewed on 5/21/2019 06:00 PM by Christina Romero    Reportable health conditions; NEGATIVE         Current Problems:     Last Reviewed on 1/29/2020 02:13 PM by Spurling, Sarah C    Essential (primary) hypertension    Other fatigue    Encounter for screening for lipoid disorders    Encounter for screening for other disorder    Other hypertrophic disorders of the skin    Encounter for screening for depression        Immunizations:     zzFluzone pf-quadrivalent 3 and up 10/1/2018        Allergies:     Last Reviewed on 1/29/2020 02:19 PM by Geetha Gross    lisinopril:   (Adverse Reaction)        Current Medications:     Last Reviewed on 1/29/2020 02:19 PM by Geetha Gross    aspirin 81 mg oral tablet, delayed release (enteric coated) [1 tab daily]        Objective:        Vitals:         Historical:     12/6/2019  BP:   122/78 mm Hg ( (left arm, , sitting, );) 6/3/2019  BP:   130/83 mm Hg ( (left arm, , sitting, );)     Current: 1/29/2020 2:21:07 PM    Ht:  5 ft, 6 in;  Wt: 190.2 lbs;  BMI: 30.7T: 98.1 F (oral);  BP: 136/80 mm Hg (left arm, sitting);  P: 83 bpm (left arm (BP Cuff),  sitting);  sCr: 0.78 mg/dL;  GFR: 91.41        Exams:     PHYSICAL EXAM:     GENERAL: vital signs recorded - well developed, well nourished;  no apparent distress;     RESPIRATORY: normal respiratory rate and pattern with no distress; normal breath sounds with no rales, rhonchi, wheezes or rubs;     CARDIOVASCULAR: normal rate; rhythm is regular;  no systolic murmur; no edema;     BREAST/INTEGUMENT: SKIN: no significant rashes or lesions; no suspicious moles;     NEUROLOGICAL:  cranial nerves, motor and sensory function, reflexes, gait and coordination are all intact;     PSYCHIATRIC:  appropriate affect and demeanor; normal speech pattern; grossly normal memory;         Assessment:         Z13.31   Encounter for screening for depression       I10   Essential (primary) hypertension           ORDERS:         Meds Prescribed:       [New Rx] losartan 50 mg oral tablet [take 1 tablet (50 mg) by oral route 2 times per day], #60 (sixty) tablets, Refills: 1 (one)         Other Orders:         Depression screen negative  (In-House)                      Plan:         Encounter for screening for depression    MIPS PHQ-9 Depression Screening: Completed form scanned and in chart; Total Score 5; Positive Depression Screen but after further evaluation the patient does not have a diagnosis of depression.            Orders:         Depression screen negative  (In-House)              Essential (primary) hypertensionWill increase to Bid and have her follow up in 1-2 months to revisit if need for additional medications          Prescriptions:       [New Rx] losartan 50 mg oral tablet [take 1 tablet (50 mg) by oral route 2 times per day], #60 (sixty) tablets, Refills: 1 (one)             Charge Capture:         Primary Diagnosis:     Z13.31  Encounter for screening for depression           Orders:      [id number]  Office/outpatient visit; established patient, level 4  (In-House)              Depression screen negative   (In-House)              I10  Essential (primary) hypertension

## 2021-05-28 VITALS
HEART RATE: 91 BPM | BODY MASS INDEX: 31.59 KG/M2 | SYSTOLIC BLOOD PRESSURE: 147 MMHG | OXYGEN SATURATION: 99 % | TEMPERATURE: 95.8 F | HEIGHT: 65 IN | RESPIRATION RATE: 16 BRPM | WEIGHT: 189.6 LBS | DIASTOLIC BLOOD PRESSURE: 87 MMHG

## 2021-05-28 VITALS
HEART RATE: 125 BPM | TEMPERATURE: 98 F | RESPIRATION RATE: 18 BRPM | WEIGHT: 189.6 LBS | BODY MASS INDEX: 30.6 KG/M2 | OXYGEN SATURATION: 99 % | SYSTOLIC BLOOD PRESSURE: 113 MMHG | DIASTOLIC BLOOD PRESSURE: 76 MMHG

## 2021-05-28 NOTE — PROGRESS NOTES
Patient: YANA RYAN     Acct: [id number]     Report: #[id number]  UNIT #: [id number]     : 1965    Encounter Date:2021  PRIMARY CARE: NELIA SANCHEZ  ***Signed***  --------------------------------------------------------------------------------------------------------------------  NURSE INTAKE      Visit Type      New Patient Visit            Chief Complaint      breast ca      Intent of Therapy:  Curative            Referring Provider/Copies To      Referring Provider:  NELIA SANCHEZ      Primary Care Provider:  NELIA SANCHEZ      Copies To:   SANDRA HAWKINS MD; Clemencia Hernandez; NELIA SANCHEZ            History and Present Illness      Past Oncology Illness History      Patient presents to the clinic for discussion of treatment options of her     biopsy-proven invasive carcinoma of the right breast ER+, PA+, HER2-, Ki67-2%.     MRI revealed 1.2cm enhancing mass in right breast. 21 Dr. Hawkins     performed a right lumpectomy. Patient reports that the cancer was found on a     routine mammogram, she reported having some pains in the right breast. Dr. Redd amezcua performed reduction. Patient is getting her radiation at [facility] in [place].            HPI - Oncology Interim      Patient presents today for discussion of breast cancer.  This was identified on     mammogram from .  This was done for routine surveillance.  She denied any     breast complaints prior to the mammogram such as masses, nipple changes, disch    arge, bleeding.  She was up-to-date on her mammogram.  Mammogram identified a     lesion in the right breast which was of concern.  Additional imaging confirmed     the finding.  She was taken the operating room on 2021 and had a right     lumpectomy as well as bilateral mammoplasty with plastic surgery.  She states     she is healed well from the surgery.  She has been seen by radiation oncology in    [place] and is in the midst of  adjuvant radiation.  She reports 5 fractions     remaining.  She does have some radiation dermatitis as well as some mild fatigue    but is tolerating the radiation well.  She denies any other new masses or     adenopathy.  No unusual aches or pains.  She reports good appetite and energy     level.  She reports that she is going through menopause and has not had a period    in several months, she thinks the last one was around May 2020.            Cancer Details            invasive carcinoma of the right breast ER+, HI+, HER2-, Ki67-2%. Oncotype      score 16            Clinical Staging      T1c, N0, M0, stage IA            Treatments      Chemotherapy      21 Tamoxifen ordered      Radiation Therapy      XRT at [facility] 21 starting boost      Surgeries      21 Dr. Hawkins performed a right lumpectomy            Clinical Trial Participant      No            ECOG Performance Status      0            Most Recent Imaging Findings      Patient: YANA RYAN   Acct: #[id number]   Report: #[id number]            UNIT #: [id number]    DOS: 20 1208      INSURANCE:BLUE ACCESS NETWORK - [facility]   ORDER #:[id number]      LOCATION:MRI     : 1965            PROVIDERS      ADMITTING:     ATTENDING: SANDRA HAWKINS MD      FAMILY:  NELIA SANCHEZ   ORDERING:  SANDRA HAWKINS MD         OTHER:    DICTATING:  Danielle Felder MD            REQ #:[id number]   EXAM:BRSTBILWOW - MRI BREAST BILAT w wo CONTRAST      REASON FOR EXAM:  BREAST CA      REASON FOR VISIT:  BREAST CA            *******Signed******         PROCEDURE:   BREAST BILATERAL W/WO CONTRAST             COMPARISON:   [facility], , DIG DX UNI POST BX NO CAD RT,     10/27/2020, 15:30.             INDICATIONS:   55-year-old female recently diagnosed with invasive ductal     carcinoma central to an       area of architectural distortion upper inner right breast.  Patient presents     for bilateral breast       MRI  to establish extent of disease.             CONTRAST:   10ML  Gadavist I.V.             TECHNIQUE:   Breast MRI was performed using dynamic intravenous gadolinium     infusion, thin sections,       and a dedicated breast coil.  Contrast enhancement analysis was assisted by     computer-aided       detection.               AMOUNT OF FIBROGLANDULAR TISSUE:   Heterogeneous fibroglandular tissue             BACKGROUND PARENCHYMAL ENHANCEMENT:   Minimal symmetric             FINDINGS:      The cardiac bolus is adequate.  Numerous bilateral breast cysts the largest in     the retroareolar       region of the left breast measuring 2.7 cm.             Left breast:  No suspicious mass or non mass enhancement stands out above     minimal background       parenchymal enhancement.             Right breast:  1.2 cm enhancing mass adjacent to biopsy clip within     architectural distortion upper       inner right breast posterior depth from biopsy-proven invasive ductal carcinoma.     Linear non mass       enhancement extends anterior and superior to the index tumor.  1.5 cm linear non    mass enhancement       extending from index tumor to pectoralis muscle.  There are adjacent foci of     rapid uptake and       washout kinetics along the anterior margin of the pectoralis muscle.  These may     be indicative of       multifocal disease or possibly post inflammatory changes related to recent     biopsy.  There is no       suspicious chest wall enhancement.  A 0.7 cm enhancing lesion in the lower     central right breast (T1       post calvin series 95/160) demonstrates initial medium enhancement and delayed type    II plateau       kinetics.             There are bilateral axillary lymph nodes with preserved central fatty merissa.  No     axillary or       intramammary chain adenopathy.  No abnormal skin or nipple enhancement.             IMPRESSION:      Right breast:  1.2 cm biopsy-proven invasive ductal carcinoma upper inner right      breast posterior       depth.  Associated linear non mass enhancement extends anterior superiorly 2 cm     and posteriorly 1.5       cm with extension to the pectoralis muscle.             Right breast:  0.7 cm enhancing lesion lower central right breast.  Recommend MR    directed biopsy to       evaluate for multicentric disease.             Right breast:  Foci of enhancement along the right anterior pectoralis     musculature either from post       biopsy inflammatory changes or multifocal malignancy.             Left breast:  Benign left breast MRI.             BIRADS:      DIAGNOSTIC CATEGORY 4--SUSPICIOUS               RECOMMENDATION(S):        MRI-GUIDED BREAST BIOPSY: RIGHT BREAST                        PLEASE NOTE:  A NORMAL MRI DOES NOT EXCLUDE THE POSSIBILITY OF BREAST CANCER.  A    CLINICALLY       SUSPICIOUS PALPABLE LUMP SHOULD BE BIOPSIED.                Danielle Felder M.D.             Electronically Signed and Approved By: Danielle Felder M.D. on 2020 at     15:49                               Until signed, this is an unconfirmed preliminary report that may contain      errors and is subject to change.                                              RAMSR:      D:20 1549            PAST, FAMILY   Past Medical History      Past Medical History:  Hypertension      Other PMH:        radiation; patient reports last menstrual cycle in May 2020      Hematology/Oncology (F):  Breast Cancer            Past Surgical History      Biopsy (right breast x 2), Lumpectomy (right)            reduction, left eye surgery,             Family History            father-heart attack. diabetes. still living.       mother-still living.            Social History      Marital Status:        Lives independently:  Yes      Number of Children:  3      Occupation:  occupational therapist at [employer].            Tobacco Use      Tobacco status:  Never smoker      Currently Vaping:  No             Alcohol Use      Alcohol intake:  None            Substance Use      Substance use:  Denies use            REVIEW OF SYSTEMS      General:  Denies: Fatigue, Fever, Night Sweats, Weight Loss      ENT:  Denies Headache      Cardiovascular:  Denies Chest Pain, Denies Palpitations      Respiratory:  Denies: Cough, Shortness of Air      Gastrointestinal:  Denies Diarrhea      Musculoskeletal:  Admits Painful Joints      Neurologic:  Denies Dizziness, Denies Numbness\Tingling      Psychiatric:  Denies Anxiety, Denies Depression      Reproductive:  Admits: Menopause (Perimenopausal)      Other      Insomnia            VITAL SIGNS AND SCORES      Vitals      Height 5 ft 4.96 in / 165 cm      Weight 189 lbs 9.530 oz / 86 kg      BSA 1.93 m2      BMI 31.6 kg/m2      Temperature 95.8 F / 35.44 C - Temporal      Pulse 91      Respirations 16      Blood Pressure 147/87 Sitting, Left Arm      Pulse Oximetry 99%, rm air            Pain Score      Pain Scale Used:  Numerical      Pain Intensity:  2            Fatigue Score      Fatigue (0-10 scale):  0 (none)            Rehab to be Ordered      Type of Referral to be Ordered:  No needs identified            EXAM      General Appearance:  Positive for: Alert, Cooperative;          Negative for: Acute Distress      Eye:  Positive for: Anicteric Sclerae, Moist Conjunctiva      Neck:  Positive for: Supple;          Negative for: JVD, Masses      Respiratory:  Positive for: CTAB, Normal Respiratory Effort      Breast - Left:  Positive for: Appearance (Well-healed mammoplasty incision);          Negative for: Adenopathy      Breast - Right:  Positive for: Appearance (Well-healed mammoplasty and sentinel     node incisions), Skin Changes (Radiation dermatitis without ulceration);          Negative for: Adenopathy      Abdomen/Gastro:  Positive for: Normal Active Bowel Sounds, Soft;          Negative for: Distention, Hepatosplenomegaly, Tenderness      Cardiovascular:  Positive  for: RRR;          Negative for: Gallop, Murmur, Peripheral Edema, Rub      Psychiatric:  Positive for: Appropriate Affect, Intact Judgement      Lymphatic:  Negative for: Axillary, Cervical, Infraclavicular, Supraclavicular            PREVENTION      Hx Influenza Vaccination:  No      2 or More Falls in Past Year?:  No      Fall Past Year with Injury?:  No      Hx Pneumococcal Vaccination:  No      Encouraged to follow-up with:  PCP regarding preventative exams.      Chart initiated by      CRISTA PASCUAL MA            ALLERGY/MEDS      Allergies      Coded Allergies:             NO KNOWN ALLERGIES (Unverified , 2/8/21)            Medications      Last Reconciled on 2/9/21 16:31 by TESHA JACKSON      Tamoxifen Citrate (Tamoxifen*) 20 Mg Tab      20 MG PO QDAY for 30 Days, #30 TAB 2 Refills         Prov: TESHA JACKSON         2/9/21       Losartan Potassium (Losartan*) 25 Mg Tablet      50 MG PO BID, #120 TAB 0 Refills         Reported         2/9/21       Losartan Potassium (Losartan*) 50 Mg Tablet      50 MG PO BID, #60 TAB 0 Refills         Reported         11/25/20      Medications Reviewed:  Changes made to meds            IMPRESSION/PLAN      Diagnosis      Malignant neoplasm of right breast in female, estrogen receptor positive         Malignant neoplasm of right breast in female, estrogen receptor positive,        unspecified site of breast         Breast location: unspecified site of breast      Patient is status post lumpectomy with sentinel node biopsy-stage I breast     cancer.  She is in the midst of adjuvant radiation with 5 fractions remaining.      She does have some mild radiation dermatitis to the skin.  Oncotype DX score is     low risk-she does not require adjuvant chemotherapy.  She is perimenopausal.      Hormone receptors are strongly positive.  I would recommend adjuvant hormone     therapy with tamoxifen 20 mg daily for a minimum of 5 years.  Should she become     menopausal over that  time, I would transition to aromatase therapy.  Side     effects, risk and benefits of tamoxifen discussed with patient.  Written     teaching information provided.  She is agreeable to the treatment.  Prescription    sent to pharmacy.  She can begin as soon as radiation is complete.  We also     discussed low-fat low-cholesterol diet and routine aerobic exercise as lifestyle    modifications to decrease the risk of breast cancer recurrence.  She will return    in 1 month for follow-up with our nurse practitioner, Cynthia to assess her     tolerance to the tamoxifen therapy.  For her complaints of insomnia, we     discussed over-the-counter remedies such as melatonin or Tylenol PM.            Notes      New Medications      * Losartan Potassium (Losartan*) 25 MG TABLET: 50 MG PO BID #120      * Tamoxifen Citrate (Tamoxifen*) 20 MG TAB: 20 MG PO QDAY 30 Days #30      Discontinued Medications      * Docusate Sodium (Colace) 100 MG CAPSULE: 100 MG PO BID PRN CONSTIPATION #30      * Cephalexin Monohydrate (Keflex) 500 MG CAPSULE: 500 MG PO QID #20         Instructions: Take until all taken.      * ONDANSETRON HCL 4 MG TABLET: 4 MG PO Q6H PRN NAUSEA AND/OR VOMITING #20      * oxyCODONE-Acetaminophen 5-325 Mg 1 EACH TABLET: 1 TAB PO Q6H PRN PAIN #20            Pain      Follow-up with PCP/Pain Management            Advanced Care Plan Discussion      Declines Discussion 1124F            Patient Education            Aerobic Exercise      Breast Self-exam (BSE)      Tamoxifen      Patient Education Provided:  Yes            Electronically signed by TESHA JACKSON  02/09/2021 16:32       Disclaimer: Converted document may not contain table formatting or lab diagrams. Please see [employer] [facility] for the authenticated document.

## 2021-05-28 NOTE — PROGRESS NOTES
Patient: CHIQUITA DAMON     Acct: [id number]     Report: #[id number]  UNIT #: [id number]     : 1965    Encounter Date:2021  PRIMARY CARE: NELIA SANCHEZ  ***Signed***  --------------------------------------------------------------------------------------------------------------------  NURSE INTAKE      Visit Type      Established Patient Visit            Chief Complaint      BREAST CA F/U            Referring Provider/Copies To      Referring Provider:  NELIA SANCHEZ      Primary Care Provider:  NELIA SANCHEZ      Copies To:   NELIA SANCHEZ            History and Present Illness      Past Oncology Illness History      Patient presents to the clinic for discussion of treatment options of her     biopsy-proven invasive carcinoma of the right breast ER+, CA+, HER2-, Ki67-2%.     MRI revealed 1.2cm enhancing mass in right breast. 21 Dr. Hawkins     performed a right lumpectomy. Patient reports that the cancer was found on a     routine mammogram, she reported having some pains in the right breast. Dr. Hernandez performed reduction. Patient is getting her radiation at [facility] in [place].            HPI - Oncology Interim      1)Right breast cancer: invasive carcinoma of the right breast ER+, CA+, HER2-,     Ki67-2%. Oncotype score 16.            Ms. Chiquita Damon presents for for 1 month follow up to see how she is     tolerating Tamoxifen therapy.             She completed right lumpectomy, radiation to the right breast completing in     2021. Oncotype DX score of 16 with no benefit for chemotherapy.             Patient report she has been taking Tamoxifen for the last one month. She does     report she has few hot flashes which is causing her loss of sleep. She does     report she is taking Melatonin and is also taking Vitamin E for the hot flashes.          Ms. Damon plans to continue Tamoxifen for now.            Cancer Details            invasive  carcinoma of the right breast ER+, MT+, HER2-, Ki67-2%. Oncotype      score 16            Clinical Staging      T1c, N0, M0, stage IA            Treatments      Chemotherapy      21 Tamoxifen ordered      Radiation Therapy      XRT at [facility] 21 starting boost      Surgeries      21 Dr. Hawkins performed a right lumpectomy            Clinical Trial Participant      No            ECOG Performance Status      0            PAST, FAMILY   Past Medical History      Past Medical History:  Hypertension      Other PMH:        radiation; patient reports last menstrual cycle in May 2020      Hematology/Oncology (F):  Breast Cancer            Past Surgical History      Biopsy (right breast x 2), Lumpectomy (right)            reduction, left eye surgery,             Family History            father-heart attack. diabetes. still living.       mother-still living.            Social History      Marital Status:        Lives independently:  Yes      Number of Children:  3      Occupation:  occupational therapist at [employer].            Tobacco Use      Tobacco status:  Never smoker      Currently Vaping:  No            Alcohol Use      Alcohol intake:  None            Substance Use      Substance use:  Denies use            REVIEW OF SYSTEMS      General:  Admits: Fatigue;          Denies: Appetite Change, Fever, Night Sweats, Weight Gain, Weight Loss      Eye:  Denies Blurred Vision, Denies Corrective Lenses, Denies Diplopia, Denies     Vision Changes      ENT:  Denies Headache, Denies Hearing Loss, Denies Hoarseness, Denies Sore     Throat      Cardiovascular:  Denies Chest Pain, Denies Palpitations      Respiratory:  Denies: Cough, Coughing Blood, Productive Cough, Shortness of Air,    Wheezing      Gastrointestinal:  Denies Bloody Stools, Denies Constipation, Denies Diarrhea,     Denies Nausea/Vomiting, Denies Problem Swallowing, Denies Unable to Control     Bowels       Genitourinary:  Denies Blood in Urine, Denies Incontinence, Denies Painful     Urination      Musculoskeletal:  Denies Back Pain, Denies Muscle Pain, Denies Painful Joints      Integumentary:  Denies Itching, Denies Lesions, Denies Rash      Neurologic:  Denies Dizziness, Denies Numbness\Tingling, Denies Seizures      Psychiatric:  Denies Anxiety, Denies Depression      Endocrine:  Denies Cold Intolerance, Denies Heat Intolerance      Hematologic/Lymphatic:  Denies Bruising, Denies Bleeding, Denies Enlarged Lymph     Nodes      Reproductive:  Denies: Menopause, Heavy Periods, Pregnant, Still Menstruating            VITAL SIGNS AND SCORES      Vitals      Weight 189 lbs 9.530 oz / 86 kg      Temperature 98 F / 36.67 C - Temporal      Pulse 125      Respirations 18      Blood Pressure 113/76 Sitting, Left Arm      Pulse Oximetry 99%, RM AIR            Pain Score      Experiencing any pain?:  No            Fatigue Score      Experiencing any fatigue?:  Yes      Fatigue (0-10 scale):  1            PT/OT Functional Screening      No needs identified            Speech Functional Screening      No needs identified            Rehab to be Ordered      Type of Referral to be Ordered:  No needs identified            EXAM      General appearance:  in no apparent distress, cooperative, appears stated age.      HEENT: No pallor, no icterus, oral mucosa moist      Neck: Supple, trachea central-not deviated      Lymph nodes: none palpable peripherally      Cardiovascular: S1-S2 heard, no murmurs, no rubs, no gallops.      Breast exam:      Respiratory: Clear to auscultation bilaterally, no adventitious sounds      Abdomen/gastro: Soft, nontender, no palpable hepatosplenomegaly, bowel sounds     heard      Skin: No lesions, no rashes, no petechiae.      Extremities: No pedal edema, peripheral pulses felt, no clubbing      Musculoskeletal: Normal tone, no rigidity of muscles; range of motion intact      Spine: No deformities       Psychiatric: Alert and oriented x3, appropriate affect, intact judgment      Neurologic: Cranial nerves II through XII grossly intact, no gross neurological     deficits noted.            PREVENTION      Hx Influenza Vaccination:  No      2 or More Falls in Past Year?:  No      Fall Past Year with Injury?:  No      Hx Pneumococcal Vaccination:  No      Encouraged to follow-up with:  PCP regarding preventative exams.      Chart initiated by      CRISTA PASCUAL MA            ALLERGY/MEDS      Allergies      Coded Allergies:             NO KNOWN ALLERGIES (Unverified , 3/12/21)            Medications      Last Reconciled on 3/12/21 14:17 by ISMAEL ECKERT      Tamoxifen Citrate (Tamoxifen*) 20 Mg Tab      20 MG PO QDAY for 30 Days, #30 TAB 2 Refills         Prov: MANUELTESHA         2/9/21       Losartan Potassium (Losartan*) 25 Mg Tablet      50 MG PO BID, #120 TAB 0 Refills         Reported         2/9/21       Losartan Potassium (Losartan*) 50 Mg Tablet      50 MG PO BID, #60 TAB 0 Refills         Reported         11/25/20      Medications Reviewed:  No Changes made to meds            IMPRESSION/PLAN      Impression      1)Right breast cancer: invasive carcinoma of the right breast ER+, MT+, HER2-,     Ki67-2%. Oncotype score 16.            Ms. Chiquita Damon presents for for 1 month follow up to see how she is     tolerating Tamoxifen therapy.             She completed right lumpectomy, radiation to the right breast completing in     February 2021. Oncotype DX score of 16 with no benefit for chemotherapy.             Patient report she has been taking Tamoxifen for the last one month. She does     report she has few hot flashes which is causing her loss of sleep. She does     report she is taking Melatonin and is also taking Vitamin E for the hot flashes.          Ms. Damon plans to continue Tamoxifen for now.             She has not had any menstrual cycles since she thinks around May of 2020. We      will consider checking hormone levels at the next visit when if she is still not    had any menstrual cycles will be 1 year with menstrual abstinence. We discussed     dressing in layers, lowering thermostat, drinking increased fluids and weight     loss.            We can consider switching to aromatose inhibitor therapy with Anastrozole,     Letrozole or Exemestane once she is menopausal for 1 full year.             She will follow up in 3 months with Dr. Lou.            Plan      She will call with refill request  for Tamoxifen when she gets home through     [employer].             Continue Vitamin E for now. Can consider Effexor, 37.5 mg if hot flashes     continue.             Continue Melatonin for insomnia.             Return in 3 months with MD or NP.            Pain      Pain Zero Today            Advanced Care Plan Discussion      Declines Discussion 1124F            Patient Education            Tamoxifen      Patient Education Provided:  Yes            Electronically signed by ISMAEL ECKERT onc  03/12/2021 14:17       Disclaimer: Converted document may not contain table formatting or lab diagrams. Please see [employer] [facility] for the authenticated document.

## 2021-06-03 ENCOUNTER — HOSPITAL ENCOUNTER (OUTPATIENT)
Dept: OTHER | Facility: HOSPITAL | Age: 56
Discharge: HOME | End: 2021-06-03
Attending: PLASTIC SURGERY

## 2021-06-07 RX ORDER — LOSARTAN POTASSIUM 50 MG/1
50 TABLET ORAL 2 TIMES DAILY
Qty: 180 TABLET | Refills: 0 | Status: SHIPPED | OUTPATIENT
Start: 2021-06-07 | End: 2021-09-08

## 2021-06-10 ENCOUNTER — TELEPHONE (OUTPATIENT)
Dept: ONCOLOGY | Facility: HOSPITAL | Age: 56
End: 2021-06-10

## 2021-06-10 NOTE — TELEPHONE ENCOUNTER
pt called and states that she received a letter from the [facility] where she had her Mammogram done and it states that the Mammo is suspicious. pt asking what needs to be done now. pt also states she is working and if she does not answer her phone it is ok to leave a message.     NICHOL RN

## 2021-06-11 DIAGNOSIS — N64.89 BREAST ASYMMETRY: Primary | ICD-10-CM

## 2021-06-11 DIAGNOSIS — R92.8 ABNORMAL MAMMOGRAM: ICD-10-CM

## 2021-06-30 ENCOUNTER — HOSPITAL ENCOUNTER (OUTPATIENT)
Dept: MAMMOGRAPHY | Facility: HOSPITAL | Age: 56
Discharge: HOME | End: 2021-06-30

## 2021-06-30 ENCOUNTER — HOSPITAL ENCOUNTER (OUTPATIENT)
Dept: ULTRASOUND IMAGING | Facility: HOSPITAL | Age: 56
Discharge: HOME | End: 2021-06-30

## 2021-06-30 PROCEDURE — 77065 DX MAMMO INCL CAD UNI: CPT

## 2021-06-30 PROCEDURE — G0279 TOMOSYNTHESIS, MAMMO: HCPCS

## 2021-06-30 PROCEDURE — 76642 ULTRASOUND BREAST LIMITED: CPT

## 2021-07-01 ENCOUNTER — OFFICE VISIT (OUTPATIENT)
Dept: ONCOLOGY | Facility: HOSPITAL | Age: 56
End: 2021-07-01

## 2021-07-01 VITALS
OXYGEN SATURATION: 100 % | HEART RATE: 80 BPM | SYSTOLIC BLOOD PRESSURE: 126 MMHG | BODY MASS INDEX: 30.67 KG/M2 | WEIGHT: 184.08 LBS | RESPIRATION RATE: 16 BRPM | TEMPERATURE: 98.5 F | DIASTOLIC BLOOD PRESSURE: 75 MMHG

## 2021-07-01 VITALS
TEMPERATURE: 97.7 F | WEIGHT: 187 LBS | DIASTOLIC BLOOD PRESSURE: 82 MMHG | SYSTOLIC BLOOD PRESSURE: 149 MMHG | BODY MASS INDEX: 30.05 KG/M2 | HEART RATE: 76 BPM | HEIGHT: 66 IN

## 2021-07-01 VITALS
HEART RATE: 76 BPM | BODY MASS INDEX: 29.96 KG/M2 | HEIGHT: 66 IN | SYSTOLIC BLOOD PRESSURE: 122 MMHG | WEIGHT: 186.4 LBS | TEMPERATURE: 98 F | DIASTOLIC BLOOD PRESSURE: 78 MMHG

## 2021-07-01 VITALS
TEMPERATURE: 98.4 F | HEART RATE: 85 BPM | SYSTOLIC BLOOD PRESSURE: 147 MMHG | HEIGHT: 66 IN | DIASTOLIC BLOOD PRESSURE: 90 MMHG | WEIGHT: 186.8 LBS | BODY MASS INDEX: 30.02 KG/M2

## 2021-07-01 VITALS
SYSTOLIC BLOOD PRESSURE: 130 MMHG | HEIGHT: 66 IN | TEMPERATURE: 98.1 F | BODY MASS INDEX: 29.63 KG/M2 | WEIGHT: 184.4 LBS | HEART RATE: 91 BPM | DIASTOLIC BLOOD PRESSURE: 83 MMHG

## 2021-07-01 DIAGNOSIS — C50.911 MALIGNANT NEOPLASM OF RIGHT BREAST IN FEMALE, ESTROGEN RECEPTOR POSITIVE, UNSPECIFIED SITE OF BREAST (HCC): Primary | ICD-10-CM

## 2021-07-01 DIAGNOSIS — Z17.0 MALIGNANT NEOPLASM OF RIGHT BREAST IN FEMALE, ESTROGEN RECEPTOR POSITIVE, UNSPECIFIED SITE OF BREAST (HCC): Primary | ICD-10-CM

## 2021-07-01 PROBLEM — C50.919 BREAST CANCER: Status: ACTIVE | Noted: 2021-07-01

## 2021-07-01 PROBLEM — I10 HIGH BLOOD PRESSURE: Status: ACTIVE | Noted: 2021-07-01

## 2021-07-01 PROCEDURE — 99213 OFFICE O/P EST LOW 20 MIN: CPT | Performed by: INTERNAL MEDICINE

## 2021-07-01 PROCEDURE — G0463 HOSPITAL OUTPT CLINIC VISIT: HCPCS | Performed by: INTERNAL MEDICINE

## 2021-07-01 RX ORDER — TAMOXIFEN CITRATE 20 MG/1
TABLET ORAL
COMMUNITY
Start: 2021-05-30 | End: 2021-07-01 | Stop reason: SDUPTHER

## 2021-07-01 RX ORDER — TAMOXIFEN CITRATE 20 MG/1
20 TABLET ORAL DAILY
Qty: 90 TABLET | Refills: 1 | Status: SHIPPED | OUTPATIENT
Start: 2021-07-01 | End: 2021-09-03

## 2021-07-01 NOTE — ASSESSMENT & PLAN NOTE
Patient is on adjuvant tamoxifen.  Tolerating well.  I see no evidence of disease recurrence by history or physical examination.  Mammogram reviewed.  This shows a small cyst which is likely benign but radiology recommends a 6-month interval mammogram.

## 2021-07-01 NOTE — PROGRESS NOTES
Chief Complaint  Breast Cancer (-FOLLOWUP)    Nick, Christina, KARYN Romero, Christina, KARYN    Subjective          Chiquita Damon presents to [facility]EMATOLOG[facility]OGY for follow-up of right breast cancer.  She is on adjuvant tamoxifen started 2/21.  She is compliant with her regimen.  She has problems or side effects from medication.  No new masses or adenopathy.  No unusual aches or pains.  She reports good appetite and energy level.  Overall she feels well.    Oncology/Hematology History Overview Note   invasive carcinoma of the right breast ER+, CA+, HER2-, Ki67-2%. Oncotype      score 16            Clinical Staging      T1c, N0, M0, stage IA            Treatments      Chemotherapy      2/9/21 Tamoxifen ordered        Radiation Therapy      XRT at [facility] 2/9/21 starting boost        Surgeries      12/1/20 Dr. Hawkins performed a right lumpectomy               Malignant neoplasm of right breast in female, estrogen receptor positive (CMS/HCC)   7/1/2021 Initial Diagnosis    Breast cancer (CMS/HCC)     7/1/2021 Cancer Staged    Staging form: Breast, AJCC 8th Edition  - Clinical: cT1c, cN0, cM0, ER+, CA+, HER2- - Signed by Maxim Lou MD on 7/1/2021         Review of Systems   Constitutional: Positive for fatigue (2/10). Negative for appetite change, diaphoresis, fever, unexpected weight gain and unexpected weight loss.   HENT: Negative for hearing loss, mouth sores, sore throat, swollen glands, trouble swallowing and voice change.    Eyes: Negative for blurred vision.   Respiratory: Negative for cough, shortness of breath and wheezing.    Cardiovascular: Negative for chest pain, palpitations and leg swelling.   Gastrointestinal: Negative for abdominal pain, blood in stool, constipation, diarrhea, nausea and vomiting.   Endocrine: Negative for cold intolerance and heat intolerance.   Genitourinary: Negative for difficulty urinating, dysuria, frequency, hematuria and urinary  incontinence.   Musculoskeletal: Negative for arthralgias, back pain and myalgias.   Skin: Negative for rash, skin lesions and bruise.   Neurological: Negative for dizziness, seizures, weakness, numbness and headache.   Hematological: Does not bruise/bleed easily.   Psychiatric/Behavioral: Negative for depressed mood. The patient is not nervous/anxious.    All other systems reviewed and are negative.  PT C/O OF LEG CRAMPS AT NIGHT  Current Outpatient Medications on File Prior to Visit   Medication Sig Dispense Refill   • losartan (COZAAR) 50 MG tablet Take 1 tablet by mouth 2 (Two) Times a Day for 90 days. Follow up appt needed 180 tablet 0   • [DISCONTINUED] tamoxifen (NOLVADEX) 20 MG chemo tablet        No current facility-administered medications on file prior to visit.       No Known Allergies  Past Medical History:   Diagnosis Date   • Breast cancer (CMS/[facility])    • High blood pressure    • Malignant neoplasm of right breast in female, estrogen receptor positive (CMS/HCC) 2021     Past Surgical History:   Procedure Laterality Date   •  SECTION     • REDUCTION MAMMAPLASTY  2020     Social History     Socioeconomic History   • Marital status:      Spouse name: Not on file   • Number of children: Not on file   • Years of education: Not on file   • Highest education level: Not on file   Tobacco Use   • Smoking status: Never Smoker     Family History   Problem Relation Age of Onset   • Diabetes Father    • Diabetes Paternal Grandmother    • Heart disease Other    • Diabetes Paternal Aunt        Objective   Physical Exam  Vitals reviewed. Exam conducted with a chaperone present.   Constitutional:       General: She is not in acute distress.     Appearance: Normal appearance.   HENT:      Head: Normocephalic and atraumatic.   Eyes:      Conjunctiva/sclera: Conjunctivae normal.      Pupils: Pupils are equal, round, and reactive to light.   Cardiovascular:      Rate and Rhythm: Normal rate and  regular rhythm.      Heart sounds: Normal heart sounds. No murmur heard.   No gallop.    Pulmonary:      Effort: Pulmonary effort is normal.      Breath sounds: Normal breath sounds.   Chest:      Breasts:         Right: Normal. No swelling, bleeding, inverted nipple, mass, nipple discharge, skin change or tenderness.         Left: Normal. No swelling, bleeding, inverted nipple, mass, nipple discharge, skin change or tenderness.       Abdominal:      General: Abdomen is flat. Bowel sounds are normal. There is no distension.      Palpations: Abdomen is soft.      Tenderness: There is no abdominal tenderness.   Musculoskeletal:      Cervical back: Neck supple. No tenderness.      Right lower leg: No edema.      Left lower leg: No edema.   Lymphadenopathy:      Upper Body:      Right upper body: No supraclavicular or axillary adenopathy.      Left upper body: No supraclavicular or axillary adenopathy.   Neurological:      Mental Status: She is alert and oriented to person, place, and time.   Psychiatric:         Mood and Affect: Mood normal.         Behavior: Behavior normal.         Vitals:    07/01/21 1424   BP: 126/75   Pulse: 80   Resp: 16   Temp: 98.5 °F (36.9 °C)   SpO2: 100%   Weight: 83.5 kg (184 lb 1.4 oz)   PainSc: 0-No pain     ECOG score: 0         PHQ-9 Total Score: 0                  Result Review :   The following data was reviewed by: Maxim Lou MD on 07/01/2021:  Lab Results   Component Value Date    HGB 13.70 08/05/2020    HCT 38.9 08/05/2020    MCV 91.5 08/05/2020    .00 08/05/2020    WBC 6.40 08/05/2020    MONOSABS 0.34 08/05/2020    EOSABS 0.07 08/05/2020    BASOSABS 0.06 08/05/2020     Lab Results   Component Value Date    BUN 11 08/05/2020    CREATININE 0.86 08/05/2020     08/05/2020    K 4.1 08/05/2020     08/05/2020    CO2 26 08/05/2020    CALCIUM 9.2 08/05/2020    PROTEINTOT 6.8 08/05/2020    ALBUMIN 4.0 08/05/2020    BILITOT 0.29 08/05/2020    ALKPHOS 73 08/05/2020     AST 19 08/05/2020    ALT 22 08/05/2020           Assessment and Plan    Diagnoses and all orders for this visit:    1. Malignant neoplasm of right breast in female, estrogen receptor positive, unspecified site of breast (CMS/HCC) (Primary)  Assessment & Plan:  Patient is on adjuvant tamoxifen.  Tolerating well.  I see no evidence of disease recurrence by history or physical examination.  Mammogram reviewed.  This shows a small cyst which is likely benign but radiology recommends a 6-month interval mammogram.      Orders:  -     tamoxifen (NOLVADEX) 20 MG chemo tablet; Take 1 tablet by mouth Daily.  Dispense: 90 tablet; Refill: 1          Patient Follow Up: 3 months     patient was given instructions and counseling regarding her condition or for health maintenance advice. Please see specific information pulled into the AVS if appropriate.     Maxim Lou MD    7/1/2021

## 2021-07-02 ENCOUNTER — OFFICE VISIT (OUTPATIENT)
Dept: PLASTIC SURGERY | Facility: CLINIC | Age: 56
End: 2021-07-02

## 2021-07-02 VITALS
DIASTOLIC BLOOD PRESSURE: 80 MMHG | BODY MASS INDEX: 30.57 KG/M2 | HEIGHT: 66 IN | WEIGHT: 190.2 LBS | HEART RATE: 83 BPM | SYSTOLIC BLOOD PRESSURE: 136 MMHG | TEMPERATURE: 98.1 F

## 2021-07-02 VITALS
BODY MASS INDEX: 30.73 KG/M2 | WEIGHT: 191.2 LBS | HEART RATE: 83 BPM | DIASTOLIC BLOOD PRESSURE: 80 MMHG | HEIGHT: 66 IN | TEMPERATURE: 97.1 F | SYSTOLIC BLOOD PRESSURE: 135 MMHG

## 2021-07-02 VITALS
HEIGHT: 66 IN | TEMPERATURE: 96.7 F | SYSTOLIC BLOOD PRESSURE: 124 MMHG | WEIGHT: 192.2 LBS | BODY MASS INDEX: 30.89 KG/M2 | DIASTOLIC BLOOD PRESSURE: 79 MMHG | HEART RATE: 82 BPM

## 2021-07-02 VITALS
DIASTOLIC BLOOD PRESSURE: 83 MMHG | TEMPERATURE: 97.6 F | OXYGEN SATURATION: 96 % | HEART RATE: 75 BPM | SYSTOLIC BLOOD PRESSURE: 124 MMHG

## 2021-07-02 DIAGNOSIS — C50.919 MALIGNANT NEOPLASM OF FEMALE BREAST, UNSPECIFIED ESTROGEN RECEPTOR STATUS, UNSPECIFIED LATERALITY, UNSPECIFIED SITE OF BREAST (HCC): Primary | ICD-10-CM

## 2021-07-02 DIAGNOSIS — N62 MACROMASTIA: ICD-10-CM

## 2021-07-02 PROCEDURE — 99211 OFF/OP EST MAY X REQ PHY/QHP: CPT | Performed by: PLASTIC SURGERY

## 2021-07-09 ENCOUNTER — TELEPHONE (OUTPATIENT)
Dept: PLASTIC SURGERY | Facility: CLINIC | Age: 56
End: 2021-07-09

## 2021-07-09 NOTE — TELEPHONE ENCOUNTER
----- Message from KARYN Hurt sent at 7/9/2021  2:57 PM EDT -----  Will order diagnostic mammo left breast at 6 months. Please call patient with results and let her know we will order another mammogram in 6 months.

## 2021-08-19 DIAGNOSIS — N60.02 BREAST CYST, LEFT: Primary | ICD-10-CM

## 2021-09-01 ENCOUNTER — OFFICE VISIT (OUTPATIENT)
Dept: ONCOLOGY | Facility: HOSPITAL | Age: 56
End: 2021-09-01

## 2021-09-01 VITALS
OXYGEN SATURATION: 99 % | SYSTOLIC BLOOD PRESSURE: 136 MMHG | BODY MASS INDEX: 31.63 KG/M2 | WEIGHT: 189.82 LBS | RESPIRATION RATE: 16 BRPM | HEART RATE: 77 BPM | TEMPERATURE: 98.8 F | DIASTOLIC BLOOD PRESSURE: 81 MMHG

## 2021-09-01 DIAGNOSIS — C50.911 MALIGNANT NEOPLASM OF RIGHT BREAST IN FEMALE, ESTROGEN RECEPTOR POSITIVE, UNSPECIFIED SITE OF BREAST (HCC): Primary | ICD-10-CM

## 2021-09-01 DIAGNOSIS — Z17.0 MALIGNANT NEOPLASM OF RIGHT BREAST IN FEMALE, ESTROGEN RECEPTOR POSITIVE, UNSPECIFIED SITE OF BREAST (HCC): Primary | ICD-10-CM

## 2021-09-01 PROCEDURE — 99213 OFFICE O/P EST LOW 20 MIN: CPT | Performed by: INTERNAL MEDICINE

## 2021-09-01 PROCEDURE — G0463 HOSPITAL OUTPT CLINIC VISIT: HCPCS

## 2021-09-01 NOTE — PROGRESS NOTES
Chief Complaint  Breast Cancer and Follow-up    Christina Romero, Christina Bradley APRN Subjective          Chiquita Damon presents to [facility] HEMATOLOGY [facility] ONCOLOGY for follow-up of her right breast cancer.  She is status post oncoplastic resection followed by adjuvant radiation to the right breast.  She is now on adjuvant tamoxifen started 2/21.  She is taking her medication as prescribed.  She has problems or side effects.  No new masses or adenopathy.  No unusual aches or pains.  She reports occasional hot flashes but improving.  She notes good appetite and energy level.    Oncology/Hematology History Overview Note   invasive carcinoma of the right breast ER+, MI+, HER2-, Ki67-2%. Oncotype      score 16            Clinical Staging      T1c, N0, M0, stage IA            Treatments      Chemotherapy      2/9/21 Tamoxifen ordered        Radiation Therapy      XRT at [facility] 2/9/21 starting boost        Surgeries      12/1/20 Dr. Hawkins performed a right lumpectomy               Malignant neoplasm of right breast in female, estrogen receptor positive (CMS/HCC)   7/1/2021 Initial Diagnosis    Breast cancer (CMS/[facility])     7/1/2021 Cancer Staged    Staging form: Breast, AJCC 8th Edition  - Clinical: cT1c, cN0, cM0, ER+, MI+, HER2- - Signed by Maxim Lou MD on 7/1/2021         Review of Systems   Constitutional: Negative for appetite change, diaphoresis, fatigue, fever, unexpected weight gain and unexpected weight loss.   HENT: Negative for hearing loss, mouth sores, sore throat, swollen glands, trouble swallowing and voice change.    Eyes: Negative for blurred vision.   Respiratory: Negative for cough, shortness of breath and wheezing.    Cardiovascular: Negative for chest pain and palpitations.   Gastrointestinal: Negative for abdominal pain, blood in stool, constipation, diarrhea, nausea and vomiting.   Endocrine: Negative for cold intolerance and heat intolerance.    Genitourinary: Negative for difficulty urinating, dysuria, frequency, hematuria and urinary incontinence.   Musculoskeletal: Negative for arthralgias, back pain and myalgias.   Skin: Negative for rash, skin lesions and bruise.   Neurological: Negative for dizziness, seizures, weakness, numbness and headache.   Hematological: Does not bruise/bleed easily.   Psychiatric/Behavioral: Negative for depressed mood. The patient is not nervous/anxious.      Current Outpatient Medications on File Prior to Visit   Medication Sig Dispense Refill   • losartan (COZAAR) 50 MG tablet Take 1 tablet by mouth 2 (Two) Times a Day for 90 days. Follow up appt needed 180 tablet 0   • tamoxifen (NOLVADEX) 20 MG chemo tablet Take 1 tablet by mouth Daily. 90 tablet 1     No current facility-administered medications on file prior to visit.       No Known Allergies  Past Medical History:   Diagnosis Date   • Breast cancer (CMS/[facility])    • High blood pressure    • Macromastia    • Malignant neoplasm of right breast in female, estrogen receptor positive (CMS/[facility]) 2021     Past Surgical History:   Procedure Laterality Date   • BILATERAL BREAST REDUCTION  2020   • BREAST LUMPECTOMY Right    •  SECTION     • REDUCTION MAMMAPLASTY  2020     Social History     Socioeconomic History   • Marital status:      Spouse name: Not on file   • Number of children: Not on file   • Years of education: Not on file   • Highest education level: Not on file   Tobacco Use   • Smoking status: Never Smoker   • Smokeless tobacco: Never Used   Vaping Use   • Vaping Use: Never used   Substance and Sexual Activity   • Alcohol use: Never   • Drug use: Never     Family History   Problem Relation Age of Onset   • Diabetes Father    • Diabetes Paternal Grandmother    • Heart disease Other    • Diabetes Paternal Aunt        Objective   Physical Exam  Vitals reviewed. Exam conducted with a chaperone present.   Constitutional:       Appearance:  Normal appearance.   Cardiovascular:      Rate and Rhythm: Normal rate and regular rhythm.      Heart sounds: Normal heart sounds. No murmur heard.   No gallop.    Pulmonary:      Effort: Pulmonary effort is normal.      Breath sounds: Normal breath sounds.   Chest:      Breasts:         Right: No swelling, bleeding, inverted nipple, mass, nipple discharge, skin change or tenderness.         Left: No swelling, bleeding, inverted nipple, mass, nipple discharge, skin change or tenderness.       Abdominal:      General: Abdomen is flat. Bowel sounds are normal. There is no distension.      Palpations: Abdomen is soft.      Tenderness: There is no abdominal tenderness.   Musculoskeletal:      Cervical back: Neck supple.      Right lower leg: No edema.      Left lower leg: No edema.   Lymphadenopathy:      Cervical: No cervical adenopathy.      Upper Body:      Right upper body: No supraclavicular or axillary adenopathy.      Left upper body: No supraclavicular or axillary adenopathy.   Neurological:      Mental Status: She is alert and oriented to person, place, and time.   Psychiatric:         Mood and Affect: Mood normal.         Behavior: Behavior normal.         Vitals:    09/01/21 1348   BP: 136/81   Pulse: 77   Resp: 16   Temp: 98.8 °F (37.1 °C)   SpO2: 99%   Weight: 86.1 kg (189 lb 13.1 oz)   PainSc: 0-No pain     ECOG score: 0         PHQ-9 Total Score: 2                  Result Review :   The following data was reviewed by: Maxim Lou MD on 09/01/2021:  Lab Results   Component Value Date    HGB 13.70 08/05/2020    HCT 38.9 08/05/2020    MCV 91.5 08/05/2020    .00 08/05/2020    WBC 6.40 08/05/2020    MONOSABS 0.34 08/05/2020    EOSABS 0.07 08/05/2020    BASOSABS 0.06 08/05/2020     Lab Results   Component Value Date    BUN 11 08/05/2020    CREATININE 0.86 08/05/2020     08/05/2020    K 4.1 08/05/2020     08/05/2020    CO2 26 08/05/2020    CALCIUM 9.2 08/05/2020    PROTEINTOT 6.8  08/05/2020    ALBUMIN 4.0 08/05/2020    BILITOT 0.29 08/05/2020    ALKPHOS 73 08/05/2020    AST 19 08/05/2020    ALT 22 08/05/2020           Assessment and Plan    Diagnoses and all orders for this visit:    1. Malignant neoplasm of right breast in female, estrogen receptor positive, unspecified site of breast (CMS/HCC) (Primary)  Assessment & Plan:  Patient is on adjuvant hormone therapy tamoxifen 20 mg daily.  Tolerating well.  I see no evidence of disease recurrence by history, physical examination.  She is trying to exercise.  Tamoxifen will be continued for a minimum of 5 years.  I will see her back in January after her mammogram for continued surveillance.              Patient was given instructions and counseling regarding her condition or for health maintenance advice. Please see specific information pulled into the AVS if appropriate.     Maxim Lou MD    9/1/2021

## 2021-09-01 NOTE — ASSESSMENT & PLAN NOTE
Patient is on adjuvant hormone therapy tamoxifen 20 mg daily.  Tolerating well.  I see no evidence of disease recurrence by history, physical examination.  She is trying to exercise.  Tamoxifen will be continued for a minimum of 5 years.  I will see her back in January after her mammogram for continued surveillance.

## 2021-09-03 DIAGNOSIS — Z17.0 MALIGNANT NEOPLASM OF RIGHT BREAST IN FEMALE, ESTROGEN RECEPTOR POSITIVE, UNSPECIFIED SITE OF BREAST (HCC): ICD-10-CM

## 2021-09-03 DIAGNOSIS — C50.911 MALIGNANT NEOPLASM OF RIGHT BREAST IN FEMALE, ESTROGEN RECEPTOR POSITIVE, UNSPECIFIED SITE OF BREAST (HCC): ICD-10-CM

## 2021-09-03 RX ORDER — TAMOXIFEN CITRATE 20 MG/1
20 TABLET ORAL DAILY
Qty: 90 TABLET | Refills: 1 | Status: SHIPPED | OUTPATIENT
Start: 2021-09-03 | End: 2022-03-07

## 2021-09-08 RX ORDER — LOSARTAN POTASSIUM 50 MG/1
TABLET ORAL
Qty: 180 TABLET | Refills: 0 | Status: SHIPPED | OUTPATIENT
Start: 2021-09-08 | End: 2021-12-04

## 2021-12-04 RX ORDER — LOSARTAN POTASSIUM 50 MG/1
TABLET ORAL
Qty: 180 TABLET | Refills: 0 | Status: SHIPPED | OUTPATIENT
Start: 2021-12-04 | End: 2022-02-14 | Stop reason: SDUPTHER

## 2021-12-30 ENCOUNTER — HOSPITAL ENCOUNTER (OUTPATIENT)
Dept: MAMMOGRAPHY | Facility: HOSPITAL | Age: 56
Discharge: HOME | End: 2021-12-30

## 2021-12-30 ENCOUNTER — HOSPITAL ENCOUNTER (OUTPATIENT)
Dept: ULTRASOUND IMAGING | Facility: HOSPITAL | Age: 56
Discharge: HOME | End: 2021-12-30

## 2021-12-30 ENCOUNTER — ANCILLARY ORDERS (OUTPATIENT)
Dept: PLASTIC SURGERY | Facility: CLINIC | Age: 56
End: 2021-12-30

## 2021-12-30 DIAGNOSIS — R92.8 ABNORMAL MAMMOGRAM: ICD-10-CM

## 2021-12-30 DIAGNOSIS — N60.02 BREAST CYST, LEFT: ICD-10-CM

## 2021-12-30 PROCEDURE — 77065 DX MAMMO INCL CAD UNI: CPT

## 2021-12-30 PROCEDURE — G0279 TOMOSYNTHESIS, MAMMO: HCPCS

## 2022-01-04 ENCOUNTER — OFFICE VISIT (OUTPATIENT)
Dept: ONCOLOGY | Facility: HOSPITAL | Age: 57
End: 2022-01-04

## 2022-01-04 VITALS
DIASTOLIC BLOOD PRESSURE: 81 MMHG | OXYGEN SATURATION: 97 % | TEMPERATURE: 97.8 F | HEART RATE: 100 BPM | BODY MASS INDEX: 32.21 KG/M2 | SYSTOLIC BLOOD PRESSURE: 173 MMHG | WEIGHT: 193.34 LBS | RESPIRATION RATE: 16 BRPM

## 2022-01-04 DIAGNOSIS — C50.911 MALIGNANT NEOPLASM OF RIGHT BREAST IN FEMALE, ESTROGEN RECEPTOR POSITIVE, UNSPECIFIED SITE OF BREAST: Primary | ICD-10-CM

## 2022-01-04 DIAGNOSIS — I10 HYPERTENSION, UNSPECIFIED TYPE: ICD-10-CM

## 2022-01-04 DIAGNOSIS — Z17.0 MALIGNANT NEOPLASM OF RIGHT BREAST IN FEMALE, ESTROGEN RECEPTOR POSITIVE, UNSPECIFIED SITE OF BREAST: Primary | ICD-10-CM

## 2022-01-04 DIAGNOSIS — R92.8 ABNORMALITY OF LEFT BREAST ON SCREENING MAMMOGRAM: ICD-10-CM

## 2022-01-04 PROCEDURE — G0463 HOSPITAL OUTPT CLINIC VISIT: HCPCS | Performed by: NURSE PRACTITIONER

## 2022-01-04 PROCEDURE — 99213 OFFICE O/P EST LOW 20 MIN: CPT | Performed by: NURSE PRACTITIONER

## 2022-01-04 NOTE — PROGRESS NOTES
Chief Complaint  Breast Cancer (-fu)    Mouser, Christina, APRN  Mouser, Christina, APRN        Subjective          Chiquita Damon presents to [facility] HEMATOLOGY [facility] ONCOLOGY for follow up on left diagnostic mammogram results.       History of Present Illness     Ms. Chiquita Damon presents for follow up after last visit in 9/1/21 with Dr. Lou. She is status post right breast lumpectomy and bilateral breast reduction per Dr. Hernandez. She was started on adjuvant Tamoxifen therapy in February of 2021. Reports she feels like the hot flashes have gotten a little worse lately, but still tolerable. Was noted to have a probably benign 6 mm lesion in the left breast with recommendation for follow up 6 month diagnostic mammogram. Diagnostic mammo completed on 12/30/21 which showed only a subcm asymmetry in the lower mid left breast.     Cancer Staging  Malignant neoplasm of right breast in female, estrogen receptor positive (HCC)  Staging form: Breast, AJCC 8th Edition  - Clinical: cT1c, cN0, cM0, ER+, MD+, HER2- - Signed by Maxim Lou MD on 7/1/2021       Treatment intent: curative    Oncology/Hematology History Overview Note   invasive carcinoma of the right breast ER+, MD+, HER2-, Ki67-2%. Oncotype      score 16            Clinical Staging      T1c, N0, M0, stage IA            Treatments      Chemotherapy      2/9/21 Tamoxifen ordered        Radiation Therapy      XRT at [facility] 2/9/21 starting boost        Surgeries      12/1/20 Dr. Hawkins performed a right lumpectomy               Malignant neoplasm of right breast in female, estrogen receptor positive (HCC)   7/1/2021 Initial Diagnosis    Breast cancer (CMS/HCC)     7/1/2021 Cancer Staged    Staging form: Breast, AJCC 8th Edition  - Clinical: cT1c, cN0, cM0, ER+, MD+, HER2- - Signed by Maxim Lou MD on 7/1/2021         Review of Systems   Constitutional: Negative for appetite change, diaphoresis, fatigue, fever, unexpected weight  gain and unexpected weight loss.   HENT: Negative for hearing loss, mouth sores, sore throat, swollen glands, trouble swallowing and voice change.    Eyes: Negative for blurred vision.   Respiratory: Negative for cough, shortness of breath and wheezing.    Cardiovascular: Negative for chest pain and palpitations.   Gastrointestinal: Negative for abdominal pain, blood in stool, constipation, diarrhea, nausea and vomiting.   Endocrine: Negative for cold intolerance and heat intolerance.   Genitourinary: Negative for difficulty urinating, dysuria, frequency, hematuria and urinary incontinence.   Musculoskeletal: Negative for arthralgias, back pain and myalgias.   Skin: Negative for rash, skin lesions and bruise.   Neurological: Negative for dizziness, seizures, weakness, numbness and headache.   Hematological: Does not bruise/bleed easily.   Psychiatric/Behavioral: Negative for depressed mood. The patient is not nervous/anxious.    All other systems reviewed and are negative.      Current Outpatient Medications on File Prior to Visit   Medication Sig Dispense Refill   • losartan (COZAAR) 50 MG tablet Take 1 tablet by mouth twice daily. **Please schedule a follow up appointment before next fill.** 180 tablet 0   • tamoxifen (NOLVADEX) 20 MG chemo tablet Take 1 tablet by mouth daily. 90 tablet 1     No current facility-administered medications on file prior to visit.       No Known Allergies  Past Medical History:   Diagnosis Date   • Breast cancer (HCC)    • High blood pressure    • Macromastia    • Malignant neoplasm of right breast in female, estrogen receptor positive (HCC) 2021     Past Surgical History:   Procedure Laterality Date   • BILATERAL BREAST REDUCTION  2020   • BREAST LUMPECTOMY Right    •  SECTION     • REDUCTION MAMMAPLASTY  2020     Social History     Socioeconomic History   • Marital status:    Tobacco Use   • Smoking status: Never Smoker   • Smokeless tobacco: Never  Used   Vaping Use   • Vaping Use: Never used   Substance and Sexual Activity   • Alcohol use: Never   • Drug use: Never     Family History   Problem Relation Age of Onset   • Diabetes Father    • Diabetes Paternal Grandmother    • Heart disease Other    • Diabetes Paternal Aunt      Immunization History   Administered Date(s) Administered   • COVID-19 (PFIZER) 08/20/2021, 09/13/2021       Objective   Physical Exam  Vitals and nursing note reviewed.   Constitutional:       Appearance: Normal appearance. She is normal weight.   HENT:      Head: Normocephalic.      Mouth/Throat:      Mouth: Mucous membranes are moist.   Eyes:      Pupils: Pupils are equal, round, and reactive to light.   Cardiovascular:      Rate and Rhythm: Normal rate and regular rhythm.      Heart sounds: Normal heart sounds.   Pulmonary:      Effort: No respiratory distress.      Breath sounds: Normal breath sounds.   Abdominal:      General: Bowel sounds are normal.      Palpations: Abdomen is soft.   Musculoskeletal:         General: No swelling. Normal range of motion.      Cervical back: Normal range of motion and neck supple.   Skin:     General: Skin is warm and dry.      Capillary Refill: Capillary refill takes less than 2 seconds.   Neurological:      General: No focal deficit present.      Mental Status: She is alert and oriented to person, place, and time. Mental status is at baseline.   Psychiatric:         Mood and Affect: Mood normal.         Behavior: Behavior normal.         Thought Content: Thought content normal.         Judgment: Judgment normal.     breast exam: no palpable lumps, masses or adenopathy palpated to either breasts.     Vitals:    01/04/22 1259   BP: 173/81   Pulse: 100   Resp: 16   Temp: 97.8 °F (36.6 °C)   SpO2: 97%   Weight: 87.7 kg (193 lb 5.5 oz)   PainSc: 0-No pain               ECOG: (0) Fully Active - Able to Carry On All Pre-disease Performance Without Restriction  Fall Risk Assessment was completed, and  patient is at low risk for falls.  PHQ-9 Total Score: 0       The patient is not  experiencing fatigue. Fatigue score: 0    PT/OT Functional Screening: PT fx screen: No needs identified  Speech Functional Screening: Speech fx screen: No needs identified  Rehab to be ordered: Rehab to be ordered: No needs identified        Result Review :   The following data was reviewed by: KARYN Zhao on 01/04/2022:  Lab Results   Component Value Date    HGB 13.70 08/05/2020    HCT 38.9 08/05/2020    MCV 91.5 08/05/2020    .00 08/05/2020    WBC 6.40 08/05/2020    MONOSABS 0.34 08/05/2020    EOSABS 0.07 08/05/2020    BASOSABS 0.06 08/05/2020     Lab Results   Component Value Date    GLUCOSE 86 08/05/2020    BUN 11 08/05/2020    CREATININE 0.86 08/05/2020     08/05/2020    K 4.1 08/05/2020     08/05/2020    CO2 26 08/05/2020    CALCIUM 9.2 08/05/2020    PROTEINTOT 6.8 08/05/2020    ALBUMIN 4.0 08/05/2020    BILITOT 0.29 08/05/2020    ALKPHOS 73 08/05/2020    AST 19 08/05/2020    ALT 22 08/05/2020          Assessment and Plan    Diagnoses and all orders for this visit:    1. Malignant neoplasm of right breast in female, estrogen receptor positive, unspecified site of breast (HCC) (Primary)  -     Mammo Diagnostic Bilateral With CAD; Future    2. Abnormality of left breast on screening mammogram  -     Mammo Diagnostic Bilateral With CAD; Future    3. Hypertension, unspecified type    Other orders  -     Cancel: Mammo Diagnostic Digital Tomosynthesis Bilateral With CAD; Standing  -     Cancel: Mammo Diagnostic Digital Tomosynthesis Bilateral With CAD    Doing well with the Tamoxifen therapy at this time. We discussed side effects of Tamoxifen therapy. Reports last menstrual cycle in May of 2020. We discussed checking hormone levels at her next visit with possible change from Tamoxifen to Letrozole or Anastrozole.     We discussed signs / symptoms of palpable breast masses, adenopathy and monthly self  breast exams as well as other signs of recurrence.     She will follow up with Dr. Lou after next diagnostic mammo of bilateral breasts in 6 months. She was given a copy of diagnostic mammogram report.     Continue Tamoxifen. Does not need refills.     Diagnostic mammogram scheduled for 6 months.     Discussed follow up with PCP for hypertension if this continues. Currently on Losartan.       Patient Follow Up: 6 months with Dr. Lou.     Patient was given instructions and counseling regarding her condition or for health maintenance advice. Please see specific information pulled into the AVS if appropriate.     Arminda Bucio, APRN    1/4/2022

## 2022-01-13 ENCOUNTER — TELEPHONE (OUTPATIENT)
Dept: FAMILY MEDICINE CLINIC | Age: 57
End: 2022-01-13

## 2022-01-13 RX ORDER — AMLODIPINE BESYLATE 2.5 MG/1
2.5 TABLET ORAL NIGHTLY
Qty: 30 TABLET | Refills: 1 | Status: SHIPPED | OUTPATIENT
Start: 2022-01-13 | End: 2022-01-14 | Stop reason: ALTCHOICE

## 2022-01-13 NOTE — TELEPHONE ENCOUNTER
Pt called and left a vm that her diastolic blood pressure is as high as 118 I returned a call no answer . I called husbands number on file and spoke with pt and she is 140/105 sick with covid since last Friday  She is having  Headache dizziness ,she is on  losartan 50 mg bid , she took that at 7 am this morning . I advised that she needs to continue to monitor and seek the er if symptoms worsen but I will let the on call provider know and see what he advises .       I called her back and  It is 125/85 currently.

## 2022-01-13 NOTE — TELEPHONE ENCOUNTER
I have pended an order for amlodipine.  I would recommend adding this at nighttime at least for the short-term.  Please confirm which pharmacy she would like that sent to.  I would rather send to a local pharmacy since this may be a short-term medication.  Otherwise, I would recommend follow-up and/or other evaluation as per Shivani.  Thanks.

## 2022-01-13 NOTE — TELEPHONE ENCOUNTER
Caller: Chiquita Damon    Relationship: Self    Best call back number: [phone]    What was the call regarding:   BP /87    DAUGHTER IS GOING TO  A NEW BLOOD PRESSURE CUFF TO SEE IF THAT IS PART OF THE PROBLEM     Do you require a callback: YES, PLEASE CALL BACK AND ADVISE.

## 2022-01-14 ENCOUNTER — TELEPHONE (OUTPATIENT)
Dept: FAMILY MEDICINE CLINIC | Age: 57
End: 2022-01-14

## 2022-01-14 NOTE — TELEPHONE ENCOUNTER
Discontinue amlodipine, continue losartan.  I would recommend that she  get the new cuff, she is ill so this is probably contributing to her symptoms.  Keep a log of her blood pressure over the next week , varying times of the day  - and follow up in 2 weeks, sooner if her BP elevates / remains elevated

## 2022-01-14 NOTE — TELEPHONE ENCOUNTER
Caller: YANA    Relationship: SELF     Best call back number: [phone]    What is the best time to reach you: ANYTIME    Who are you requesting to speak with (clinical staff, provider,  specific staff member): CLINICAL    What was the call regarding: BLOOD PRESSURE /90, AND COULD NOT GET PRESCRIPTION THAT WAS WRITTEN BECAUSE [facility] WAS OUT OF IT AND WONDERED IF THERE WAS SOMEWHERE ELSE TO SEND IT?     Do you require a callback: YES

## 2022-02-14 NOTE — TELEPHONE ENCOUNTER
Caller: Chiquita Damon    Relationship: Self    Best call back number: [phone]    Requested Prescriptions:   Requested Prescriptions     Pending Prescriptions Disp Refills   • losartan (COZAAR) 50 MG tablet 180 tablet 0      Pharmacy where request should be sent: [facility], [place] - [address] [place] [address] - [phone]  - [phone] FX     Does the patient have less than a 3 day supply:  [] Yes  [x] No    Ravindra Zhao Rep   02/14/22 15:22 EST

## 2022-02-15 ENCOUNTER — TELEPHONE (OUTPATIENT)
Dept: FAMILY MEDICINE CLINIC | Age: 57
End: 2022-02-15

## 2022-02-15 NOTE — TELEPHONE ENCOUNTER
Pt is calling and there is a national shortage on losartan and they do not see any improvement with being able to get this med can you please change the pharmacist recommended omesartan or candesartan please advise she would like this sent to pill pack

## 2022-02-16 RX ORDER — LOSARTAN POTASSIUM 50 MG/1
50 TABLET ORAL 2 TIMES DAILY
Qty: 180 TABLET | Refills: 0 | Status: SHIPPED | OUTPATIENT
Start: 2022-02-16 | End: 2022-02-18 | Stop reason: RX

## 2022-02-18 ENCOUNTER — TELEPHONE (OUTPATIENT)
Dept: FAMILY MEDICINE CLINIC | Age: 57
End: 2022-02-18

## 2022-02-18 DIAGNOSIS — I10 PRIMARY HYPERTENSION: ICD-10-CM

## 2022-02-18 DIAGNOSIS — I10 PRIMARY HYPERTENSION: Primary | ICD-10-CM

## 2022-02-18 RX ORDER — VALSARTAN 80 MG/1
80 TABLET ORAL 2 TIMES DAILY
Qty: 180 TABLET | Refills: 1 | Status: SHIPPED | OUTPATIENT
Start: 2022-02-18 | End: 2022-02-28

## 2022-02-18 RX ORDER — VALSARTAN 80 MG/1
80 TABLET ORAL 2 TIMES DAILY
Qty: 60 TABLET | Refills: 0 | Status: SHIPPED | OUTPATIENT
Start: 2022-02-18 | End: 2022-02-18 | Stop reason: SDUPTHER

## 2022-02-18 RX ORDER — VALSARTAN 80 MG/1
80 TABLET ORAL 2 TIMES DAILY
Qty: 60 TABLET | Refills: 5 | Status: SHIPPED | OUTPATIENT
Start: 2022-02-18 | End: 2022-02-18 | Stop reason: SDUPTHER

## 2022-02-18 NOTE — TELEPHONE ENCOUNTER
Caller: Chiquita Damon A    Relationship: Self        What is the best time to reach you: NEW BLOOD PRESSURE MEDICATION LOZARTIN IS OUT     [facility] [place] [facility] [address]  - [phone]  - [phone] FX  [phone]    Do you require a callback: YES

## 2022-02-28 ENCOUNTER — TELEPHONE (OUTPATIENT)
Dept: FAMILY MEDICINE CLINIC | Age: 57
End: 2022-02-28

## 2022-02-28 ENCOUNTER — OFFICE VISIT (OUTPATIENT)
Dept: FAMILY MEDICINE CLINIC | Age: 57
End: 2022-02-28

## 2022-02-28 VITALS
HEART RATE: 81 BPM | TEMPERATURE: 98.1 F | SYSTOLIC BLOOD PRESSURE: 124 MMHG | BODY MASS INDEX: 31.16 KG/M2 | HEIGHT: 65 IN | WEIGHT: 187 LBS | DIASTOLIC BLOOD PRESSURE: 72 MMHG

## 2022-02-28 DIAGNOSIS — I10 PRIMARY HYPERTENSION: Chronic | ICD-10-CM

## 2022-02-28 DIAGNOSIS — Z00.00 ROUTINE GENERAL MEDICAL EXAMINATION AT A HEALTH CARE FACILITY: Primary | ICD-10-CM

## 2022-02-28 DIAGNOSIS — C50.911 MALIGNANT NEOPLASM OF RIGHT BREAST IN FEMALE, ESTROGEN RECEPTOR POSITIVE, UNSPECIFIED SITE OF BREAST: ICD-10-CM

## 2022-02-28 DIAGNOSIS — Z12.11 SCREENING FOR COLON CANCER: ICD-10-CM

## 2022-02-28 DIAGNOSIS — Z17.0 MALIGNANT NEOPLASM OF RIGHT BREAST IN FEMALE, ESTROGEN RECEPTOR POSITIVE, UNSPECIFIED SITE OF BREAST: ICD-10-CM

## 2022-02-28 PROCEDURE — 99396 PREV VISIT EST AGE 40-64: CPT | Performed by: NURSE PRACTITIONER

## 2022-02-28 RX ORDER — VALSARTAN 80 MG/1
80 TABLET ORAL 2 TIMES DAILY
Qty: 180 TABLET | Refills: 1 | Status: SHIPPED | OUTPATIENT
Start: 2022-02-28 | End: 2022-03-17 | Stop reason: SDUPTHER

## 2022-02-28 NOTE — PROGRESS NOTES
Chief Complaint  Chiquita Damon presents to [facility] for Hypertension (med refill )    Subjective          Chiquita is here today for annual exam.  Last annual exam was 1 year ago.     Immunization status:  Due zoster, tdap and booster Covid pending  Last eye exam:   [place]  1 year  Last dental exam:   Every 6 months  - [place]  Smoking status:   Social History    Tobacco Use      Smoking status: Never Smoker      Smokeless tobacco: Never Used    Alcohol use:   Social History    Substance and Sexual Activity      Alcohol use: Never     Recreational drug use:   Social History    Substance and Sexual Activity      Drug use: Never     Diet / exercise:   Well balanced / trying   LMP:   6/2019  Last pap:   Last Completed Pap Smear          PAP SMEAR (Every 3 Years) Next due on 6/3/2022    06/03/2019  Done         Result:   Normal   Family history of breast cancer:   None           Chiquita presents for follow up on hypertension.  Compliance with medication is reported as good   Check of BP at home reported as well controlled.  No new concerns or problems to report.  She is still taking the Losartan until she runs out and then will begin to take the Valsartan .  This medication was changed due to shortage on the medication        Review of Systems   Genitourinary: Positive for breast pain.         No Known Allergies   Past Medical History:   Diagnosis Date   • Breast cancer (HCC)    • High blood pressure    • Macromastia    • Malignant neoplasm of right breast in female, estrogen receptor positive (HCC) 7/1/2021     Current Outpatient Medications   Medication Sig Dispense Refill   • tamoxifen (NOLVADEX) 20 MG chemo tablet Take 1 tablet by mouth daily. 90 tablet 1   • valsartan (DIOVAN) 80 MG tablet Take 1 tablet by mouth 2 (Two) Times a Day. Replaces old  tablet 1     No current facility-administered medications for this visit.     Past Surgical History:   Procedure Laterality  "Date   • BILATERAL BREAST REDUCTION  2020   • BREAST LUMPECTOMY Right 2020   •  SECTION     • REDUCTION MAMMAPLASTY  2020      Social History     Tobacco Use   • Smoking status: Never Smoker   • Smokeless tobacco: Never Used   Vaping Use   • Vaping Use: Never used   Substance Use Topics   • Alcohol use: Never   • Drug use: Never     Family History   Problem Relation Age of Onset   • Diabetes Father    • Diabetes Paternal Grandmother    • Heart disease Other    • Diabetes Paternal Aunt      Health Maintenance Due   Topic Date Due   • COLORECTAL CANCER SCREENING  Never done      Immunization History   Administered Date(s) Administered   • COVID-19 (PFIZER) PURPLE CAP 2021, 2021, 2021        Objective     Vitals:    22 1111   BP: 124/72   BP Location: Left arm   Patient Position: Sitting   Pulse: 81   Temp: 98.1 °F (36.7 °C)   Weight: 84.8 kg (187 lb)   Height: 165 cm (64.96\")     Body mass index is 31.16 kg/m².     Physical Exam  Vitals reviewed.   Constitutional:       Appearance: Normal appearance. She is well-developed. She is not ill-appearing.   HENT:      Head: Normocephalic and atraumatic.      Right Ear: Tympanic membrane, ear canal and external ear normal.      Left Ear: Tympanic membrane, ear canal and external ear normal.      Mouth/Throat:      Lips: Pink.      Mouth: Mucous membranes are moist.      Pharynx: Oropharynx is clear. Uvula midline. No oropharyngeal exudate.   Eyes:      Extraocular Movements: Extraocular movements intact.      Conjunctiva/sclera: Conjunctivae normal.      Pupils: Pupils are equal, round, and reactive to light.   Neck:      Thyroid: No thyromegaly.   Cardiovascular:      Rate and Rhythm: Normal rate and regular rhythm.      Heart sounds: No murmur heard.  No friction rub. No gallop.    Pulmonary:      Effort: Pulmonary effort is normal.      Breath sounds: Normal breath sounds. No wheezing or rhonchi.   Abdominal:      " General: Bowel sounds are normal. There is no distension.      Palpations: Abdomen is soft.      Tenderness: There is no abdominal tenderness.   Musculoskeletal:      Cervical back: Normal range of motion.   Lymphadenopathy:      Head:      Right side of head: No submandibular adenopathy.      Left side of head: No submandibular adenopathy.      Cervical: No cervical adenopathy.   Skin:     General: Skin is warm and dry.      Findings: No lesion or rash.   Neurological:      Mental Status: She is alert and oriented to person, place, and time.      Cranial Nerves: No cranial nerve deficit.      Gait: Gait is intact.   Psychiatric:         Mood and Affect: Mood and affect normal.         Behavior: Behavior normal.         Thought Content: Thought content normal.         Judgment: Judgment normal.           Result Review :                               Assessment and Plan      Diagnoses and all orders for this visit:    1. Routine general medical examination at a health care facility (Primary)  Comments:  continue working on diet and exercise, vaccine recommendations discussed, colonoscopy order placed, labs when fasting    2. Primary hypertension  Comments:  switch to Valsartan (Losartan unavailable) and follow up in 6 months - sooner if problems with new medication  Assessment & Plan:  Hypertension is improving with treatment.  Continue current treatment regimen.  Blood pressure will be reassessed at the next regular appointment.    Orders:  -     valsartan (DIOVAN) 80 MG tablet; Take 1 tablet by mouth 2 (Two) Times a Day. Replaces old RX  Dispense: 180 tablet; Refill: 1    3. Malignant neoplasm of right breast in female, estrogen receptor positive, unspecified site of breast (HCC)  Assessment & Plan:  Continue current treatment and  follow up with Oncology as recommended       4. Screening for colon cancer  -     Ambulatory Referral For Screening Colonoscopy            Follow Up     Return in about 6 months (around  8/28/2022) for Recheck.

## 2022-03-07 DIAGNOSIS — Z17.0 MALIGNANT NEOPLASM OF RIGHT BREAST IN FEMALE, ESTROGEN RECEPTOR POSITIVE, UNSPECIFIED SITE OF BREAST: ICD-10-CM

## 2022-03-07 DIAGNOSIS — C50.911 MALIGNANT NEOPLASM OF RIGHT BREAST IN FEMALE, ESTROGEN RECEPTOR POSITIVE, UNSPECIFIED SITE OF BREAST: ICD-10-CM

## 2022-03-07 RX ORDER — TAMOXIFEN CITRATE 20 MG/1
20 TABLET ORAL DAILY
Qty: 90 TABLET | Refills: 1 | Status: SHIPPED | OUTPATIENT
Start: 2022-03-07 | End: 2022-08-29

## 2022-03-17 DIAGNOSIS — I10 PRIMARY HYPERTENSION: Chronic | ICD-10-CM

## 2022-03-17 NOTE — TELEPHONE ENCOUNTER
Caller: Chiquita Damon    Relationship: Self    Best call back number: [phone]    Requested Prescriptions:   Requested Prescriptions     Pending Prescriptions Disp Refills   • valsartan (DIOVAN) 80 MG tablet 180 tablet 1     Sig: Take 1 tablet by mouth 2 (Two) Times a Day. Replaces old RX        Pharmacy where request should be sent: [facility] - [place], [place] - [address]  - [phone]  - [phone]     Additional details provided by patient: PATIENT STATES SHE WOULD TO MAKE SURE HER ORDERS FOR LABS WERE SENT IN BECAUSE WHEN SHE WENT TO THE LAB THEY DIDN'T HAVE AND ORDERS. PLEASE CALL TO CONFIRM THEY'RE SENT.    Does the patient have less than a 3 day supply:  [] Yes  [x] No    Ravindra HORTON Rep   03/17/22 13:58 EDT

## 2022-03-18 ENCOUNTER — LAB (OUTPATIENT)
Dept: LAB | Facility: HOSPITAL | Age: 57
End: 2022-03-18

## 2022-03-18 DIAGNOSIS — Z13.6 SCREENING FOR CARDIOVASCULAR CONDITION: ICD-10-CM

## 2022-03-18 DIAGNOSIS — Z13.6 SCREENING FOR CARDIOVASCULAR CONDITION: Primary | ICD-10-CM

## 2022-03-18 LAB
ALBUMIN SERPL-MCNC: 4.2 G/DL (ref 3.5–5.2)
ALBUMIN/GLOB SERPL: 1.9 G/DL
ALP SERPL-CCNC: 63 U/L (ref 39–117)
ALT SERPL W P-5'-P-CCNC: 22 U/L (ref 1–33)
ANION GAP SERPL CALCULATED.3IONS-SCNC: 10 MMOL/L (ref 5–15)
AST SERPL-CCNC: 18 U/L (ref 1–32)
BASOPHILS # BLD AUTO: 0.03 10*3/MM3 (ref 0–0.2)
BASOPHILS NFR BLD AUTO: 0.6 % (ref 0–1.5)
BILIRUB SERPL-MCNC: 0.2 MG/DL (ref 0–1.2)
BUN SERPL-MCNC: 14 MG/DL (ref 6–20)
BUN/CREAT SERPL: 17.5 (ref 7–25)
CALCIUM SPEC-SCNC: 9 MG/DL (ref 8.6–10.5)
CHLORIDE SERPL-SCNC: 105 MMOL/L (ref 98–107)
CHOLEST SERPL-MCNC: 153 MG/DL (ref 0–200)
CO2 SERPL-SCNC: 26 MMOL/L (ref 22–29)
CREAT SERPL-MCNC: 0.8 MG/DL (ref 0.57–1)
DEPRECATED RDW RBC AUTO: 40.5 FL (ref 37–54)
EGFRCR SERPLBLD CKD-EPI 2021: 86.1 ML/MIN/1.73
EOSINOPHIL # BLD AUTO: 0.06 10*3/MM3 (ref 0–0.4)
EOSINOPHIL NFR BLD AUTO: 1.2 % (ref 0.3–6.2)
ERYTHROCYTE [DISTWIDTH] IN BLOOD BY AUTOMATED COUNT: 11.7 % (ref 12.3–15.4)
GLOBULIN UR ELPH-MCNC: 2.2 GM/DL
GLUCOSE SERPL-MCNC: 89 MG/DL (ref 65–99)
HCT VFR BLD AUTO: 36.7 % (ref 34–46.6)
HDLC SERPL-MCNC: 57 MG/DL (ref 40–60)
HGB BLD-MCNC: 12.4 G/DL (ref 12–15.9)
IMM GRANULOCYTES # BLD AUTO: 0.01 10*3/MM3 (ref 0–0.05)
IMM GRANULOCYTES NFR BLD AUTO: 0.2 % (ref 0–0.5)
LDLC SERPL CALC-MCNC: 74 MG/DL (ref 0–100)
LDLC/HDLC SERPL: 1.24 {RATIO}
LYMPHOCYTES # BLD AUTO: 1.01 10*3/MM3 (ref 0.7–3.1)
LYMPHOCYTES NFR BLD AUTO: 20.1 % (ref 19.6–45.3)
MCH RBC QN AUTO: 31.6 PG (ref 26.6–33)
MCHC RBC AUTO-ENTMCNC: 33.8 G/DL (ref 31.5–35.7)
MCV RBC AUTO: 93.4 FL (ref 79–97)
MONOCYTES # BLD AUTO: 0.31 10*3/MM3 (ref 0.1–0.9)
MONOCYTES NFR BLD AUTO: 6.2 % (ref 5–12)
NEUTROPHILS NFR BLD AUTO: 3.61 10*3/MM3 (ref 1.7–7)
NEUTROPHILS NFR BLD AUTO: 71.7 % (ref 42.7–76)
PLATELET # BLD AUTO: 190 10*3/MM3 (ref 140–450)
PMV BLD AUTO: 8.7 FL (ref 6–12)
POTASSIUM SERPL-SCNC: 4.1 MMOL/L (ref 3.5–5.2)
PROT SERPL-MCNC: 6.4 G/DL (ref 6–8.5)
RBC # BLD AUTO: 3.93 10*6/MM3 (ref 3.77–5.28)
SODIUM SERPL-SCNC: 141 MMOL/L (ref 136–145)
TRIGL SERPL-MCNC: 126 MG/DL (ref 0–150)
TSH SERPL DL<=0.05 MIU/L-ACNC: 1.62 UIU/ML (ref 0.27–4.2)
VLDLC SERPL-MCNC: 22 MG/DL (ref 5–40)
WBC NRBC COR # BLD: 5.03 10*3/MM3 (ref 3.4–10.8)

## 2022-03-18 PROCEDURE — 80061 LIPID PANEL: CPT

## 2022-03-18 PROCEDURE — 36415 COLL VENOUS BLD VENIPUNCTURE: CPT

## 2022-03-18 PROCEDURE — 80050 GENERAL HEALTH PANEL: CPT

## 2022-03-18 RX ORDER — VALSARTAN 80 MG/1
80 TABLET ORAL 2 TIMES DAILY
Qty: 180 TABLET | Refills: 1 | Status: SHIPPED | OUTPATIENT
Start: 2022-03-18 | End: 2023-01-27

## 2022-07-05 ENCOUNTER — HOSPITAL ENCOUNTER (OUTPATIENT)
Dept: MAMMOGRAPHY | Facility: HOSPITAL | Age: 57
Discharge: HOME | End: 2022-07-05

## 2022-07-05 ENCOUNTER — HOSPITAL ENCOUNTER (OUTPATIENT)
Dept: ULTRASOUND IMAGING | Facility: HOSPITAL | Age: 57
Discharge: HOME | End: 2022-07-05

## 2022-07-05 ENCOUNTER — HOSPITAL ENCOUNTER (OUTPATIENT)
Dept: MAMMOGRAPHY | Facility: HOSPITAL | Age: 57
Discharge: HOME | End: 2022-07-05
Admitting: NURSE PRACTITIONER

## 2022-07-05 DIAGNOSIS — R92.8 ABNORMALITY OF LEFT BREAST ON SCREENING MAMMOGRAM: ICD-10-CM

## 2022-07-05 DIAGNOSIS — N60.02 BREAST CYST, LEFT: ICD-10-CM

## 2022-07-05 DIAGNOSIS — R92.8 ABNORMAL MAMMOGRAM: ICD-10-CM

## 2022-07-05 DIAGNOSIS — Z17.0 MALIGNANT NEOPLASM OF RIGHT BREAST IN FEMALE, ESTROGEN RECEPTOR POSITIVE, UNSPECIFIED SITE OF BREAST: ICD-10-CM

## 2022-07-05 DIAGNOSIS — C50.911 MALIGNANT NEOPLASM OF RIGHT BREAST IN FEMALE, ESTROGEN RECEPTOR POSITIVE, UNSPECIFIED SITE OF BREAST: ICD-10-CM

## 2022-07-05 PROCEDURE — 77066 DX MAMMO INCL CAD BI: CPT

## 2022-07-05 PROCEDURE — 76642 ULTRASOUND BREAST LIMITED: CPT

## 2022-07-05 PROCEDURE — G0279 TOMOSYNTHESIS, MAMMO: HCPCS

## 2022-07-14 ENCOUNTER — LAB (OUTPATIENT)
Dept: ONCOLOGY | Facility: HOSPITAL | Age: 57
End: 2022-07-14

## 2022-07-14 ENCOUNTER — OFFICE VISIT (OUTPATIENT)
Dept: ONCOLOGY | Facility: HOSPITAL | Age: 57
End: 2022-07-14

## 2022-07-14 VITALS
OXYGEN SATURATION: 98 % | HEART RATE: 94 BPM | TEMPERATURE: 97.4 F | DIASTOLIC BLOOD PRESSURE: 83 MMHG | WEIGHT: 193.56 LBS | BODY MASS INDEX: 32.25 KG/M2 | RESPIRATION RATE: 16 BRPM | SYSTOLIC BLOOD PRESSURE: 135 MMHG

## 2022-07-14 DIAGNOSIS — C50.111 MALIGNANT NEOPLASM OF CENTRAL PORTION OF RIGHT BREAST IN FEMALE, ESTROGEN RECEPTOR POSITIVE: ICD-10-CM

## 2022-07-14 DIAGNOSIS — Z17.0 MALIGNANT NEOPLASM OF CENTRAL PORTION OF RIGHT BREAST IN FEMALE, ESTROGEN RECEPTOR POSITIVE: ICD-10-CM

## 2022-07-14 DIAGNOSIS — C50.111 MALIGNANT NEOPLASM OF CENTRAL PORTION OF RIGHT BREAST IN FEMALE, ESTROGEN RECEPTOR POSITIVE: Primary | ICD-10-CM

## 2022-07-14 DIAGNOSIS — Z17.0 MALIGNANT NEOPLASM OF CENTRAL PORTION OF RIGHT BREAST IN FEMALE, ESTROGEN RECEPTOR POSITIVE: Primary | ICD-10-CM

## 2022-07-14 LAB
ESTRADIOL SERPL HS-MCNC: 8.9 PG/ML
FSH SERPL-ACNC: 31.6 MIU/ML
LH SERPL-ACNC: 14.4 MIU/ML

## 2022-07-14 PROCEDURE — G0463 HOSPITAL OUTPT CLINIC VISIT: HCPCS | Performed by: INTERNAL MEDICINE

## 2022-07-14 PROCEDURE — 83001 ASSAY OF GONADOTROPIN (FSH): CPT

## 2022-07-14 PROCEDURE — 83002 ASSAY OF GONADOTROPIN (LH): CPT

## 2022-07-14 PROCEDURE — 36415 COLL VENOUS BLD VENIPUNCTURE: CPT

## 2022-07-14 PROCEDURE — 82670 ASSAY OF TOTAL ESTRADIOL: CPT

## 2022-07-14 PROCEDURE — 99214 OFFICE O/P EST MOD 30 MIN: CPT | Performed by: INTERNAL MEDICINE

## 2022-07-14 RX ORDER — MULTIVITAMIN WITH IRON
TABLET ORAL
COMMUNITY

## 2022-07-14 NOTE — PROGRESS NOTES
Chief Complaint  Breast Cancer    MouserChristina APRMATILDA    Lucille Damon presents to [facility] for follow-up of breast cancer.  She is on adjuvant tamoxifen.  Taking her medication daily as prescribed.  She denies new masses or adenopathy, no unusual aches or pains.  She denies vaginal discharge or bleeding.  She has not had a menstrual cycle in more than a year.  She notes good appetite and energy level.  She is trying exercise.    Oncology/Hematology History Overview Note   invasive carcinoma of the right breast ER+, LA+, HER2-, Ki67-2%. Oncotype      score 16            Clinical Staging      T1c, N0, M0, stage IA            Treatments      Chemotherapy      2/9/21 Tamoxifen ordered        [facility]        Surgeries      12/1/20 Dr. Hawkins performed a right lumpectomy               Malignant neoplasm of central portion of right breast in female, estrogen receptor positive (HCC)   7/1/2021 Initial Diagnosis    Breast cancer (CMS/HCC)     7/1/2021 Cancer Staged    Staging form: Breast, AJCC 8th Edition  - Clinical: cT1c, cN0, cM0, ER+, LA+, HER2- - Signed by Maxim Lou MD on 7/1/2021         Review of Systems   Constitutional: Negative for appetite change, diaphoresis, fatigue, fever, unexpected weight gain and unexpected weight loss.   HENT: Negative for hearing loss, mouth sores, sore throat, swollen glands, trouble swallowing and voice change.    Eyes: Negative for blurred vision.   Respiratory: Negative for cough, shortness of breath and wheezing.    Cardiovascular: Negative for chest pain and palpitations.   Gastrointestinal: Negative for abdominal pain, blood in stool, constipation, diarrhea, nausea and vomiting.   Endocrine: Negative for cold intolerance and heat intolerance.   Genitourinary: Negative for difficulty urinating, dysuria, frequency, hematuria and urinary incontinence.    Musculoskeletal: Negative for arthralgias, back pain and myalgias.   Skin: Negative for rash, skin lesions and wound.   Neurological: Negative for dizziness, seizures, weakness, numbness and headache.   Hematological: Does not bruise/bleed easily.   Psychiatric/Behavioral: Negative for depressed mood. The patient is not nervous/anxious.      Current Outpatient Medications on File Prior to Visit   Medication Sig Dispense Refill   • Magnesium 250 MG tablet Take  by mouth.     • tamoxifen (NOLVADEX) 20 MG chemo tablet Take 1 tablet by mouth daily. 90 tablet 1   • valsartan (DIOVAN) 80 MG tablet Take 1 tablet by mouth 2 (Two) Times a Day. 180 tablet 1     No current facility-administered medications on file prior to visit.       No Known Allergies  Past Medical History:   Diagnosis Date   • Breast cancer (HCC)    • High blood pressure    • Macromastia    • Malignant neoplasm of central portion of right breast in female, estrogen receptor positive (HCC) 2021   • Malignant neoplasm of right breast in female, estrogen receptor positive (HCC) 2021     Past Surgical History:   Procedure Laterality Date   • BILATERAL BREAST REDUCTION  2020   • BREAST LUMPECTOMY Right 2020   •  SECTION     • REDUCTION MAMMAPLASTY  2020     Social History     Socioeconomic History   • Marital status:    Tobacco Use   • Smoking status: Never Smoker   • Smokeless tobacco: Never Used   Vaping Use   • Vaping Use: Never used   Substance and Sexual Activity   • Alcohol use: Never   • Drug use: Never     Family History   Problem Relation Age of Onset   • Diabetes Father    • Diabetes Paternal Grandmother    • Heart disease Other    • Diabetes Paternal Aunt        Objective   Physical Exam  Vitals reviewed. Exam conducted with a chaperone present.   Constitutional:       General: She is not in acute distress.     Appearance: Normal appearance.   Cardiovascular:      Rate and Rhythm: Normal rate and regular  rhythm.      Heart sounds: Normal heart sounds. No murmur heard.    No gallop.   Pulmonary:      Effort: Pulmonary effort is normal.      Breath sounds: Normal breath sounds.   Abdominal:      General: Abdomen is flat. Bowel sounds are normal.      Palpations: Abdomen is soft.   Musculoskeletal:      Cervical back: Neck supple.      Right lower leg: No edema.      Left lower leg: No edema.   Lymphadenopathy:      Cervical: No cervical adenopathy.   Neurological:      Mental Status: She is alert and oriented to person, place, and time.   Psychiatric:         Behavior: Behavior normal.         Vitals:    07/14/22 1130   BP: 135/83   Pulse: 94   Resp: 16   Temp: 97.4 °F (36.3 °C)   SpO2: 98%   Weight: 87.8 kg (193 lb 9 oz)   PainSc: 0-No pain     ECOG score: 0         PHQ-9 Total Score:                    Result Review :   The following data was reviewed by: Maxim Lou MD on 07/14/2022:  Lab Results   Component Value Date    HGB 12.4 03/18/2022    HCT 36.7 03/18/2022    MCV 93.4 03/18/2022     03/18/2022    WBC 5.03 03/18/2022    NEUTROABS 3.61 03/18/2022    LYMPHSABS 1.01 03/18/2022    MONOSABS 0.31 03/18/2022    EOSABS 0.06 03/18/2022    BASOSABS 0.03 03/18/2022     Lab Results   Component Value Date    GLUCOSE 89 03/18/2022    BUN 14 03/18/2022    CREATININE 0.80 03/18/2022     03/18/2022    K 4.1 03/18/2022     03/18/2022    CO2 26.0 03/18/2022    CALCIUM 9.0 03/18/2022    PROTEINTOT 6.4 03/18/2022    ALBUMIN 4.20 03/18/2022    BILITOT 0.2 03/18/2022    ALKPHOS 63 03/18/2022    AST 18 03/18/2022    ALT 22 03/18/2022     Lab Results   Component Value Date    TSH 1.620 03/18/2022       Data reviewed: Radiologic studies Diagnostic mammogram bilateral reviewed.      Assessment and Plan    Diagnoses and all orders for this visit:    1. Malignant neoplasm of central portion of right breast in female, estrogen receptor positive (HCC) (Primary)  Assessment & Plan:  Patient is on adjuvant tamoxifen  therapy.  I see no evidence of disease recurrence by history, physical examination or recent mammogram.  She has not had a menstrual cycle for more than a year.  I will check lab work including FSH, LH and estradiol to assess ovarian function.  If she is menopausal, I would recommend switching to aromatase inhibitor.  If the ovaries remain functional, continue tamoxifen.  I will let her know the results of her lab work and otherwise plan to see her back in 3 months for continued surveillance.    Orders:  -     Estradiol; Future  -     Follicle Stimulating Hormone; Future  -     Luteinizing Hormone; Future          Patient Follow Up: 3 months    Patient was given instructions and counseling regarding her condition or for health maintenance advice. Please see specific information pulled into the AVS if appropriate.     Maxim Lou MD    7/15/2022

## 2022-07-15 NOTE — ASSESSMENT & PLAN NOTE
Patient is on adjuvant tamoxifen therapy.  I see no evidence of disease recurrence by history, physical examination or recent mammogram.  She has not had a menstrual cycle for more than a year.  I will check lab work including FSH, LH and estradiol to assess ovarian function.  If she is menopausal, I would recommend switching to aromatase inhibitor.  If the ovaries remain functional, continue tamoxifen.  I will let her know the results of her lab work and otherwise plan to see her back in 3 months for continued surveillance.

## 2022-07-22 ENCOUNTER — TELEPHONE (OUTPATIENT)
Dept: ONCOLOGY | Facility: HOSPITAL | Age: 57
End: 2022-07-22

## 2022-07-22 NOTE — TELEPHONE ENCOUNTER
Caller: Chiquita Damon    Relationship: Self    Best call back number: [phone]    What is the best time to reach you: ANYTIME    Who are you requesting to speak with (clinical staff, provider,  specific staff member): DR JACKSON OR NURSE    What was the call regarding: PT STATED THAT DR JACKSON MENTIONED CHECKING HER LABS AND POSSIBLY MAKING A CHANGE TO HER TAMOXIFEN. SHE JUST WANTED TO F/U ON THIS.    PLEASE CALL PT TO ADVISE.     Do you require a callback: YES

## 2022-08-29 DIAGNOSIS — C50.911 MALIGNANT NEOPLASM OF RIGHT BREAST IN FEMALE, ESTROGEN RECEPTOR POSITIVE, UNSPECIFIED SITE OF BREAST: ICD-10-CM

## 2022-08-29 DIAGNOSIS — Z17.0 MALIGNANT NEOPLASM OF RIGHT BREAST IN FEMALE, ESTROGEN RECEPTOR POSITIVE, UNSPECIFIED SITE OF BREAST: ICD-10-CM

## 2022-08-29 RX ORDER — TAMOXIFEN CITRATE 20 MG/1
20 TABLET ORAL DAILY
Qty: 90 TABLET | Refills: 1 | Status: SHIPPED | OUTPATIENT
Start: 2022-08-29 | End: 2023-02-27

## 2022-10-31 ENCOUNTER — OFFICE VISIT (OUTPATIENT)
Dept: FAMILY MEDICINE CLINIC | Age: 57
End: 2022-10-31

## 2022-10-31 VITALS
BODY MASS INDEX: 32.82 KG/M2 | HEIGHT: 65 IN | WEIGHT: 197 LBS | SYSTOLIC BLOOD PRESSURE: 130 MMHG | HEART RATE: 76 BPM | TEMPERATURE: 98.5 F | DIASTOLIC BLOOD PRESSURE: 74 MMHG

## 2022-10-31 DIAGNOSIS — Z78.0 POST-MENOPAUSE: ICD-10-CM

## 2022-10-31 DIAGNOSIS — Z01.419 WELL WOMAN EXAM WITH ROUTINE GYNECOLOGICAL EXAM: Primary | ICD-10-CM

## 2022-10-31 DIAGNOSIS — Z82.62 FAMILY HISTORY OF OSTEOPOROSIS: ICD-10-CM

## 2022-10-31 DIAGNOSIS — Z12.4 SCREENING FOR CERVICAL CANCER: ICD-10-CM

## 2022-10-31 PROCEDURE — G0123 SCREEN CERV/VAG THIN LAYER: HCPCS | Performed by: NURSE PRACTITIONER

## 2022-10-31 PROCEDURE — 99396 PREV VISIT EST AGE 40-64: CPT | Performed by: NURSE PRACTITIONER

## 2022-10-31 NOTE — PROGRESS NOTES
Assessment and Plan   Diagnoses and all orders for this visit:    1. Well woman exam with routine gynecological exam (Primary)  -     IGP,rfx Aptima HPV All Pth; Future    2. Screening for cervical cancer  -     IGP,rfx Aptima HPV All Pth; Future    3. Post-menopause  -     DEXA Bone Density Axial; Future    4. Family history of osteoporosis  -     DEXA Bone Density Axial; Future                  Follow Up   Return in about 6 months (around 4/30/2023) for Annual physical, Pending lab results.    Chief Complaint  Chiquita Damon presents to [facility] for Gynecologic Exam    Subjective          History of Present Illness  WELL WOMAN EXAM:  Last well woman  exam was 3 year(s) ago.    Last pap smear was done 3 years ago and reported as normal.  Last HPV testing  3 years ago -2019.  Last menstrual period was remote.  Menopausal..    Current method of contraception is NONE.    Sexually active?  yes  Self breast exams monthly.  Denies any concerns with findings on exam. Had Right Lumpectomy 2020 for positive    Last mammogram Last Completed Mammogram          MAMMOGRAM (Yearly) Next due on 7/5/2023 07/05/2022  Mammo Diagnostic Digital Tomosynthesis Bilateral With CAD     12/30/2021  Mammo Diagnostic Digital Tomosynthesis Left With CAD     06/30/2021  Mammo diagnostic digital tomosynthesis left w CAD     06/03/2021  Mammo Screening Digital Tomosynthesis Bilateral With CAD     11/06/2020  MRI Breast Bilateral With & Without Contrast     Only the first 5 history entries have been loaded, but more history   exists.            and reported as normal.    Last DEXA was never done             Review of Systems   Genitourinary: Positive for pelvic pain. Negative for breast lump, breast pain, dysuria, urinary incontinence, vaginal bleeding, vaginal discharge and vaginal pain.       Objective     Vitals:    10/31/22 1327   BP: 130/74   BP Location: Left arm   Patient Position: Sitting   Cuff  "Size: Large Adult   Pulse: 76   Temp: 98.5 °F (36.9 °C)   TempSrc: Oral   Weight: 89.4 kg (197 lb)   Height: 165 cm (64.96\")     Body mass index is 32.82 kg/m².     Physical Exam  Constitutional:       General: She is not in acute distress.     Appearance: Normal appearance.   HENT:      Head: Normocephalic.   Cardiovascular:      Rate and Rhythm: Normal rate and regular rhythm.   Pulmonary:      Effort: Pulmonary effort is normal.      Breath sounds: Normal breath sounds.   Chest:   Breasts:     Right: No mass, skin change or tenderness.      Left: No mass, skin change or tenderness.   Genitourinary:     Exam position: Lithotomy position.      Pubic Area: No rash.       Labia:         Right: No rash or lesion.         Left: No rash or lesion.       Urethra: No prolapse or urethral pain.      Vagina: Normal.      Cervix: Discharge present.      Uterus: Normal.       Adnexa: Right adnexa normal and left adnexa normal.        Right: No tenderness.          Left: No tenderness.     Musculoskeletal:         General: Normal range of motion.   Lymphadenopathy:      Upper Body:      Right upper body: No axillary adenopathy.      Left upper body: No axillary adenopathy.   Neurological:      General: No focal deficit present.      Mental Status: She is alert and oriented to person, place, and time.   Psychiatric:         Mood and Affect: Mood normal.         Behavior: Behavior normal.         Result Review                        No Known Allergies   Past Medical History:   Diagnosis Date   • Breast cancer (HCC)    • High blood pressure    • Macromastia    • Malignant neoplasm of central portion of right breast in female, estrogen receptor positive (HCC) 7/1/2021   • Malignant neoplasm of right breast in female, estrogen receptor positive (HCC) 7/1/2021     Current Outpatient Medications   Medication Sig Dispense Refill   • ASHWAGANDHA PO Take  by mouth.     • Magnesium 250 MG tablet Take  by mouth.     • tamoxifen (NOLVADEX) " 20 MG chemo tablet Take 1 tablet by mouth daily. 90 tablet 1   • valsartan (DIOVAN) 80 MG tablet Take 1 tablet by mouth 2 (Two) Times a Day. 180 tablet 1     No current facility-administered medications for this visit.     Past Surgical History:   Procedure Laterality Date   • BILATERAL BREAST REDUCTION  2020   • BREAST LUMPECTOMY Right 2020   •  SECTION     • REDUCTION MAMMAPLASTY  2020      Health Maintenance Due   Topic Date Due   • PAP SMEAR  2022      Immunization History   Administered Date(s) Administered   • COVID-19 (PFIZER) PURPLE CAP 2021, 2021

## 2022-11-05 LAB
CONV .: NORMAL
CYTOLOGIST CVX/VAG CYTO: NORMAL
CYTOLOGY CVX/VAG DOC CYTO: NORMAL
CYTOLOGY CVX/VAG DOC THIN PREP: NORMAL
DX ICD CODE: NORMAL
HIV 1 & 2 AB SER-IMP: NORMAL
OTHER STN SPEC: NORMAL
STAT OF ADQ CVX/VAG CYTO-IMP: NORMAL

## 2023-01-02 NOTE — PROGRESS NOTES
Post-op Follow-up (6 month follow up oncoplastic BR )    Subjective            History of Present Illness  Chiquita Damon is a 55 y.o. female who presents to [facility] as Postoperative Follow-Up from oncoplastic bilateral breast reduction  12/1/2020.  Patient is doing well and is finished radiation.  Had repeat mammogram and is doing well.  Continues to follow with oncology.    Allergies: Patient has no known allergies.  Allergies Reconciled.    Review of Systems     Objective     /83 (BP Location: Left arm, Patient Position: Sitting, Cuff Size: Adult)   Pulse 75   Temp 97.6 °F (36.4 °C) (Temporal)   SpO2 96%     There is no height or weight on file to calculate BMI.    Physical Exam    Incisions healed, breasts soft with good symmetry and contour, no masses, tenderness, and no open areas.   Right breast slightly firm as expected with radiation    Result Review :                Assessment and Plan      Diagnoses and all orders for this visit:    1. Malignant neoplasm of female breast, unspecified estrogen receptor status, unspecified laterality, unspecified site of breast (CMS/[facility]) (Primary)    2. Macromastia        Plan:  • Mammogram reviewed. May resume routine mammogram schedule as recommended by PCP/GYN. Continue scar massage. RTC one year post-op.    Follow Up     No follow-ups on file.    Patient was given instructions and counseling regarding her condition. Please see specific information pulled into the AVS if appropriate.     Clemencia Hernandez MD  07/02/2021   details… ear L/ear R/nose/mouth

## 2023-01-27 DIAGNOSIS — I10 PRIMARY HYPERTENSION: Chronic | ICD-10-CM

## 2023-01-27 RX ORDER — VALSARTAN 80 MG/1
TABLET ORAL
Qty: 180 TABLET | Refills: 0 | Status: SHIPPED | OUTPATIENT
Start: 2023-01-27

## 2023-02-26 DIAGNOSIS — Z17.0 MALIGNANT NEOPLASM OF RIGHT BREAST IN FEMALE, ESTROGEN RECEPTOR POSITIVE, UNSPECIFIED SITE OF BREAST: ICD-10-CM

## 2023-02-26 DIAGNOSIS — C50.911 MALIGNANT NEOPLASM OF RIGHT BREAST IN FEMALE, ESTROGEN RECEPTOR POSITIVE, UNSPECIFIED SITE OF BREAST: ICD-10-CM

## 2023-02-27 RX ORDER — TAMOXIFEN CITRATE 20 MG/1
20 TABLET ORAL DAILY
Qty: 90 TABLET | Refills: 1 | Status: SHIPPED | OUTPATIENT
Start: 2023-02-27

## 2023-04-17 ENCOUNTER — TELEPHONE (OUTPATIENT)
Dept: ONCOLOGY | Facility: HOSPITAL | Age: 58
End: 2023-04-17

## 2023-05-01 ENCOUNTER — TELEPHONE (OUTPATIENT)
Dept: ONCOLOGY | Facility: HOSPITAL | Age: 58
End: 2023-05-01
Payer: COMMERCIAL

## 2023-05-01 DIAGNOSIS — I10 PRIMARY HYPERTENSION: Chronic | ICD-10-CM

## 2023-05-01 NOTE — TELEPHONE ENCOUNTER
Caller: Chiquita Damon    Relationship: Self    Best call back number: [phone]    What is the best time to reach you: ANYTIME    Who are you requesting to speak with (clinical staff, provider,  specific staff member):     What was the call regarding: PT RETURNING MISSED CALL  HUN UNABLE TO WT    Do you require a callback: YES PLEASE

## 2023-05-04 RX ORDER — VALSARTAN 80 MG/1
80 TABLET ORAL 2 TIMES DAILY
Qty: 180 TABLET | Refills: 0 | Status: SHIPPED | OUTPATIENT
Start: 2023-05-04

## 2023-05-16 ENCOUNTER — OFFICE VISIT (OUTPATIENT)
Dept: FAMILY MEDICINE CLINIC | Age: 58
End: 2023-05-16
Payer: COMMERCIAL

## 2023-05-16 ENCOUNTER — LAB (OUTPATIENT)
Dept: LAB | Facility: HOSPITAL | Age: 58
End: 2023-05-16
Payer: COMMERCIAL

## 2023-05-16 VITALS
SYSTOLIC BLOOD PRESSURE: 120 MMHG | HEART RATE: 74 BPM | BODY MASS INDEX: 31.32 KG/M2 | HEIGHT: 65 IN | WEIGHT: 188 LBS | DIASTOLIC BLOOD PRESSURE: 83 MMHG | OXYGEN SATURATION: 98 %

## 2023-05-16 DIAGNOSIS — Z13.6 SCREENING FOR CARDIOVASCULAR CONDITION: ICD-10-CM

## 2023-05-16 DIAGNOSIS — Z00.00 ROUTINE GENERAL MEDICAL EXAMINATION AT A HEALTH CARE FACILITY: ICD-10-CM

## 2023-05-16 DIAGNOSIS — Z83.3 FAMILY HISTORY OF DIABETES MELLITUS: ICD-10-CM

## 2023-05-16 DIAGNOSIS — M79.641 HAND PAIN, RIGHT: ICD-10-CM

## 2023-05-16 DIAGNOSIS — F41.9 ANXIETY: ICD-10-CM

## 2023-05-16 DIAGNOSIS — Z00.00 ROUTINE GENERAL MEDICAL EXAMINATION AT A HEALTH CARE FACILITY: Primary | ICD-10-CM

## 2023-05-16 DIAGNOSIS — R42 LIGHTHEADED: ICD-10-CM

## 2023-05-16 DIAGNOSIS — Z85.3 HISTORY OF RIGHT BREAST CANCER: ICD-10-CM

## 2023-05-16 DIAGNOSIS — R00.2 PALPITATIONS: ICD-10-CM

## 2023-05-16 LAB
ALBUMIN SERPL-MCNC: 4.3 G/DL (ref 3.5–5.2)
ALBUMIN/GLOB SERPL: 1.9 G/DL
ALP SERPL-CCNC: 64 U/L (ref 39–117)
ALT SERPL W P-5'-P-CCNC: 19 U/L (ref 1–33)
ANION GAP SERPL CALCULATED.3IONS-SCNC: 9.2 MMOL/L (ref 5–15)
AST SERPL-CCNC: 16 U/L (ref 1–32)
BILIRUB SERPL-MCNC: 0.2 MG/DL (ref 0–1.2)
BUN SERPL-MCNC: 15 MG/DL (ref 6–20)
BUN/CREAT SERPL: 15.2 (ref 7–25)
CALCIUM SPEC-SCNC: 9.6 MG/DL (ref 8.6–10.5)
CHLORIDE SERPL-SCNC: 109 MMOL/L (ref 98–107)
CHOLEST SERPL-MCNC: 153 MG/DL (ref 0–200)
CO2 SERPL-SCNC: 24.8 MMOL/L (ref 22–29)
CREAT SERPL-MCNC: 0.99 MG/DL (ref 0.57–1)
DEPRECATED RDW RBC AUTO: 39.1 FL (ref 37–54)
EGFRCR SERPLBLD CKD-EPI 2021: 66.2 ML/MIN/1.73
ERYTHROCYTE [DISTWIDTH] IN BLOOD BY AUTOMATED COUNT: 11.6 % (ref 12.3–15.4)
GLOBULIN UR ELPH-MCNC: 2.3 GM/DL
GLUCOSE SERPL-MCNC: 94 MG/DL (ref 65–99)
HBA1C MFR BLD: 5.3 % (ref 4.8–5.6)
HCT VFR BLD AUTO: 38.7 % (ref 34–46.6)
HDLC SERPL-MCNC: 53 MG/DL (ref 40–60)
HGB BLD-MCNC: 13.2 G/DL (ref 12–15.9)
LDLC SERPL CALC-MCNC: 75 MG/DL (ref 0–100)
LDLC/HDLC SERPL: 1.33 {RATIO}
MAGNESIUM SERPL-MCNC: 2.2 MG/DL (ref 1.6–2.6)
MCH RBC QN AUTO: 31.6 PG (ref 26.6–33)
MCHC RBC AUTO-ENTMCNC: 34.1 G/DL (ref 31.5–35.7)
MCV RBC AUTO: 92.6 FL (ref 79–97)
PLATELET # BLD AUTO: 197 10*3/MM3 (ref 140–450)
PMV BLD AUTO: 8.5 FL (ref 6–12)
POTASSIUM SERPL-SCNC: 4.5 MMOL/L (ref 3.5–5.2)
PROT SERPL-MCNC: 6.6 G/DL (ref 6–8.5)
RBC # BLD AUTO: 4.18 10*6/MM3 (ref 3.77–5.28)
SODIUM SERPL-SCNC: 143 MMOL/L (ref 136–145)
TRIGL SERPL-MCNC: 147 MG/DL (ref 0–150)
TSH SERPL DL<=0.05 MIU/L-ACNC: 1.29 UIU/ML (ref 0.27–4.2)
VLDLC SERPL-MCNC: 25 MG/DL (ref 5–40)
WBC NRBC COR # BLD: 5.79 10*3/MM3 (ref 3.4–10.8)

## 2023-05-16 PROCEDURE — 83036 HEMOGLOBIN GLYCOSYLATED A1C: CPT

## 2023-05-16 PROCEDURE — 36415 COLL VENOUS BLD VENIPUNCTURE: CPT

## 2023-05-16 PROCEDURE — 80061 LIPID PANEL: CPT

## 2023-05-16 PROCEDURE — 83735 ASSAY OF MAGNESIUM: CPT

## 2023-05-16 PROCEDURE — 80050 GENERAL HEALTH PANEL: CPT

## 2023-05-16 RX ORDER — ASPIRIN 81 MG/1
81 TABLET, CHEWABLE ORAL DAILY
COMMUNITY

## 2023-05-16 NOTE — PROGRESS NOTES
Chief Complaint  Chiquita Damon presents to [facility] for Annual Exam      Subjective     History of Present Illness  Chiquita is here today for annual exam.   Last annual exam was 1 year  Last eye exam: 2 years  PROVIDER: [facility]   Last dental exam:   6 months    PROVIDER:  [facility]  Last menstrual period:  Post menopausal ago.  Diet / exercise:   Other: intermittent fasting and irregularly  Patient's Body mass index is 31.38 kg/m². indicating that she is obese (BMI >30).  Satisfied with weight?  No  Working on it     Patient Care Team:  Christina Romero APRN as PCP - General (Nurse Practitioner)  Maxim Lou MD as Consulting Physician (Hematology and Oncology)     Chiquita presents for follow up on hypertension.  Compliance with medication : Valsartan as prescribed   Check of BP at home reported as well controlled.  No new concerns or problems to report.    Palpitations: Patient complains of dizziness, palpitations and rapid heart beat.  The symptoms are of moderate and transient severity, occuring intermittently  Does notice when in crowds  . Cardiac risk factors include: hypertension and obesity (BMI >= 30 kg/m2). Aggravating factors: exercise, stress/anxiety. Relieving factors: spontaneous/ deep breathing/resting. Associated signs and symptoms: has complaint(s) of palpitations.  Thinks that she has some type of episode on a daily basis.  Never had before.  She did recently change the type of magnesium medication she was taking but not sure if correlated when symptoms started.    Has been doing intermittent fasting x 6 weeks  Feels properly hydrated Has lost almost 10 lbs         Assessment and Plan       Diagnoses and all orders for this visit:    1. Routine general medical examination at a health care facility (Primary)  Comments:  continue with diet and exercise, health maintenance recommendations discussed (declines covid, shingrix and Tdap)   annual wellness recommended   Orders:  -     Comprehensive metabolic panel; Future  -     Lipid panel; Future  -     TSH; Future  -     CBC No Differential; Future    2. Lightheaded  Comments:  no concerning findings on exam, will check labs, holter if not indication from labs and have her follow up in 6-8 weeks to see if improved, consider CT head  Orders:  -     Holter monitor - 24 hour; Future    3. Palpitations  Comments:  will check labs and if no indication as to cause of her palpitations- will proceed with 24 hour holter.  Consider anxiety as cause given reported situational   Orders:  -     Magnesium; Future  -     TSH; Future  -     Holter monitor - 24 hour; Future    4. Family history of diabetes mellitus  -     Hemoglobin A1c; Future    5. Screening for cardiovascular condition  -     Comprehensive metabolic panel; Future  -     Lipid panel; Future  -     CBC No Differential; Future    6. History of right breast cancer    7. Hand pain, right  Comments:  she will try conservative treatment and if persists- will follow up for further work up    8. Anxiety  Comments:  may consider as cause- consider treatment if persists worsens  otherwise  continue current conservative management         Follow Up   Return in about 8 weeks (around 7/11/2023) for Recheck symptoms of dizziness/ lightheaded .      No orders of the defined types were placed in this encounter.      There are no discontinued medications.         Review of Systems   Constitutional: Negative for fatigue.   HENT: Negative for congestion and sinus pressure.    Eyes: Negative for blurred vision and visual disturbance.   Respiratory: Negative for cough and shortness of breath.    Cardiovascular: Positive for palpitations (when physically active x 1 month). Negative for chest pain and leg swelling.   Gastrointestinal: Negative for blood in stool and constipation.   Endocrine: Positive for heat intolerance. Negative for polydipsia, polyphagia and  "polyuria.   Genitourinary: Negative for urgency.   Musculoskeletal: Positive for arthralgias (right hand - x 2-3 months  trying conservative therapy). Negative for back pain.   Skin: Negative for rash and skin lesions.   Allergic/Immunologic: Negative for environmental allergies.   Neurological: Positive for light-headedness (will hit suddenly-  mostly in crowds;  not correlated with the palpitations ). Negative for headache and memory problem.   Psychiatric/Behavioral: Negative for sleep disturbance and depressed mood. The patient is nervous/anxious (nothing more than normal ).        Objective     Vitals:    05/16/23 0803   BP: 120/83   BP Location: Left arm   Patient Position: Sitting   Cuff Size: Adult   Pulse: 74   SpO2: 98%   Weight: 85.3 kg (188 lb)   Height: 164.8 cm (64.9\")     Body mass index is 31.38 kg/m².     Physical Exam  Vitals reviewed.   Constitutional:       Appearance: Normal appearance. She is well-developed. She is obese. She is not ill-appearing.   HENT:      Head: Normocephalic and atraumatic.      Right Ear: Tympanic membrane, ear canal and external ear normal.      Left Ear: Tympanic membrane, ear canal and external ear normal.      Mouth/Throat:      Lips: Pink.      Mouth: Mucous membranes are moist.      Pharynx: Oropharynx is clear. Uvula midline. No oropharyngeal exudate.   Eyes:      Extraocular Movements: Extraocular movements intact.      Conjunctiva/sclera: Conjunctivae normal.      Pupils: Pupils are equal, round, and reactive to light.   Neck:      Thyroid: No thyromegaly.   Cardiovascular:      Rate and Rhythm: Normal rate and regular rhythm.      Heart sounds: No murmur heard.    No friction rub. No gallop.   Pulmonary:      Effort: Pulmonary effort is normal.      Breath sounds: Normal breath sounds. No wheezing or rhonchi.   Abdominal:      General: Bowel sounds are normal. There is no distension.      Palpations: Abdomen is soft.      Tenderness: There is no abdominal " tenderness.   Musculoskeletal:      Cervical back: Normal range of motion.   Lymphadenopathy:      Head:      Right side of head: No submandibular adenopathy.      Left side of head: No submandibular adenopathy.      Cervical: No cervical adenopathy.   Skin:     General: Skin is warm and dry.      Findings: No lesion or rash.   Neurological:      Mental Status: She is alert and oriented to person, place, and time. Mental status is at baseline.      Cranial Nerves: No cranial nerve deficit or facial asymmetry.      Motor: No pronator drift.      Coordination: Romberg sign negative. Coordination normal.      Gait: Gait is intact.   Psychiatric:         Mood and Affect: Mood and affect normal.         Behavior: Behavior normal.         Thought Content: Thought content normal.         Judgment: Judgment normal.            Result Review                       No Known Allergies   Past Medical History:   Diagnosis Date   • Breast cancer    • High blood pressure    • Macromastia    • Malignant neoplasm of central portion of right breast in female, estrogen receptor positive 2021   • Malignant neoplasm of right breast in female, estrogen receptor positive 2021     Current Outpatient Medications   Medication Sig Dispense Refill   • ASHWAGANDHA PO Take 2 capsules by mouth 2 (Two) Times a Day. Chewable     • aspirin 81 MG chewable tablet Chew 1 tablet Daily.     • Magnesium 250 MG tablet Take 200 mg by mouth Daily.     • tamoxifen (NOLVADEX) 20 MG chemo tablet Take 1 tablet by mouth daily. 90 tablet 1   • valsartan (DIOVAN) 80 MG tablet Take 1 tablet by mouth 2 (Two) Times a Day. 180 tablet 0     No current facility-administered medications for this visit.     Past Surgical History:   Procedure Laterality Date   • BILATERAL BREAST REDUCTION  2020   • BREAST LUMPECTOMY Right 2020   •  SECTION     • REDUCTION MAMMAPLASTY  2020      There are no preventive care reminders to display for this  patient.   Immunization History   Administered Date(s) Administered   • COVID-19 (PFIZER) Purple Cap Monovalent 08/20/2021, 09/13/2021         Part of this note may be an electronic transcription/translation of spoken language to printed   text using the Dragon Dictation System.      KAYRN Andersen

## 2023-05-24 ENCOUNTER — OFFICE VISIT (OUTPATIENT)
Dept: ONCOLOGY | Facility: HOSPITAL | Age: 58
End: 2023-05-24
Payer: COMMERCIAL

## 2023-05-24 VITALS
SYSTOLIC BLOOD PRESSURE: 132 MMHG | TEMPERATURE: 97 F | WEIGHT: 188.05 LBS | RESPIRATION RATE: 16 BRPM | DIASTOLIC BLOOD PRESSURE: 78 MMHG | HEART RATE: 90 BPM | BODY MASS INDEX: 31.39 KG/M2 | OXYGEN SATURATION: 99 %

## 2023-05-24 DIAGNOSIS — Z12.31 ENCOUNTER FOR SCREENING MAMMOGRAM FOR BREAST CANCER: ICD-10-CM

## 2023-05-24 DIAGNOSIS — Z17.0 MALIGNANT NEOPLASM OF CENTRAL PORTION OF RIGHT BREAST IN FEMALE, ESTROGEN RECEPTOR POSITIVE: Primary | ICD-10-CM

## 2023-05-24 DIAGNOSIS — C50.111 MALIGNANT NEOPLASM OF CENTRAL PORTION OF RIGHT BREAST IN FEMALE, ESTROGEN RECEPTOR POSITIVE: Primary | ICD-10-CM

## 2023-05-24 PROCEDURE — G0463 HOSPITAL OUTPT CLINIC VISIT: HCPCS | Performed by: INTERNAL MEDICINE

## 2023-05-24 RX ORDER — LETROZOLE 2.5 MG/1
2.5 TABLET, FILM COATED ORAL DAILY
Qty: 60 TABLET | Refills: 0 | Status: SHIPPED | OUTPATIENT
Start: 2023-05-24

## 2023-05-24 NOTE — PROGRESS NOTES
Chief Complaint  Breast Cancer    MouserChristina APRMATILDA    Lucille Damon presents to [facility] for follow-up of breast cancer.  She is on adjuvant tamoxifen.  She is taking medication as prescribed.  She denies any masses or adenopathy.  No unusual aches or pains.  She reports adequate appetite and energy level.  She denies vaginal discharge.  She has not had a menstrual cycle for more than 2 years.  She denies lower extremity swelling.    Oncology/Hematology History Overview Note   invasive carcinoma of the right breast ER+, LA+, HER2-, Ki67-2%. Oncotype      score 16            Clinical Staging      T1c, N0, M0, stage IA            Treatments      Chemotherapy      2/9/21 Tamoxifen ordered        Radiation Therapy      XRT at [facility] 2/9/21 starting boost        Surgeries      12/1/20 Dr. Hawkins performed a right lumpectomy               Malignant neoplasm of central portion of right breast in female, estrogen receptor positive   7/1/2021 Initial Diagnosis    Breast cancer (CMS/[facility])     7/1/2021 Cancer Staged    Staging form: Breast, AJCC 8th Edition  - Clinical: cT1c, cN0, cM0, ER+, LA+, HER2- - Signed by Maxim Lou MD on 7/1/2021         Review of Systems   Constitutional: Negative for appetite change, diaphoresis, fatigue, fever, unexpected weight gain and unexpected weight loss.   HENT: Negative for hearing loss, mouth sores, sore throat, swollen glands, trouble swallowing and voice change.    Eyes: Negative for blurred vision.   Respiratory: Negative for cough, shortness of breath and wheezing.    Cardiovascular: Negative for chest pain and palpitations.   Gastrointestinal: Negative for abdominal pain, blood in stool, constipation, diarrhea, nausea and vomiting.   Endocrine: Negative for cold intolerance and heat intolerance.   Genitourinary: Negative for difficulty urinating, dysuria, frequency, hematuria and urinary incontinence.    Musculoskeletal: Negative for arthralgias, back pain and myalgias.   Skin: Negative for rash, skin lesions and wound.   Neurological: Negative for dizziness, seizures, weakness, numbness and headache.   Hematological: Does not bruise/bleed easily.   Psychiatric/Behavioral: Negative for depressed mood. The patient is not nervous/anxious.      Current Outpatient Medications on File Prior to Visit   Medication Sig Dispense Refill   • ASHWAGANDHA PO Take 2 capsules by mouth 2 (Two) Times a Day. Chewable     • aspirin 81 MG chewable tablet Chew 1 tablet Daily.     • Magnesium 250 MG tablet Take 200 mg by mouth Daily.     • valsartan (DIOVAN) 80 MG tablet Take 1 tablet by mouth 2 (Two) Times a Day. 180 tablet 0   • [DISCONTINUED] tamoxifen (NOLVADEX) 20 MG chemo tablet Take 1 tablet by mouth daily. 90 tablet 1     No current facility-administered medications on file prior to visit.       No Known Allergies  Past Medical History:   Diagnosis Date   • Breast cancer    • High blood pressure    • Macromastia    • Malignant neoplasm of central portion of right breast in female, estrogen receptor positive 2021   • Malignant neoplasm of right breast in female, estrogen receptor positive 2021     Past Surgical History:   Procedure Laterality Date   • BILATERAL BREAST REDUCTION  2020   • BREAST LUMPECTOMY Right 2020   •  SECTION     • REDUCTION MAMMAPLASTY  2020     Social History     Socioeconomic History   • Marital status:    Tobacco Use   • Smoking status: Never   • Smokeless tobacco: Never   Vaping Use   • Vaping Use: Never used   Substance and Sexual Activity   • Alcohol use: Never   • Drug use: Never     Family History   Problem Relation Age of Onset   • No Known Problems Mother    • Diabetes Father    • Heart attack Father         at 58 years old   • Diabetes Sister    • Diabetes Paternal Grandmother    • Diabetes Paternal Aunt    • Heart disease Other        Objective    Physical Exam  Vitals reviewed. Exam conducted with a chaperone present.   Constitutional:       General: She is not in acute distress.     Appearance: Normal appearance.   Cardiovascular:      Rate and Rhythm: Normal rate and regular rhythm.      Heart sounds: Normal heart sounds. No murmur heard.    No gallop.   Pulmonary:      Effort: Pulmonary effort is normal.      Breath sounds: Normal breath sounds.   Chest:   Breasts:     Right: Skin change (See diagram) present.      Left: Normal.       Abdominal:      General: Abdomen is flat. Bowel sounds are normal.      Palpations: Abdomen is soft.   Musculoskeletal:      Cervical back: Neck supple.      Right lower leg: No edema.      Left lower leg: No edema.   Lymphadenopathy:      Cervical: No cervical adenopathy.      Upper Body:      Right upper body: No supraclavicular or axillary adenopathy.      Left upper body: No supraclavicular or axillary adenopathy.   Neurological:      Mental Status: She is alert and oriented to person, place, and time.   Psychiatric:         Mood and Affect: Mood normal.         Behavior: Behavior normal.         Vitals:    05/24/23 1050   BP: 132/78   Pulse: 90   Resp: 16   Temp: 97 °F (36.1 °C)   TempSrc: Temporal   SpO2: 99%   Weight: 85.3 kg (188 lb 0.8 oz)   PainSc: 0-No pain     ECOG score: 0         PHQ-9 Total Score:                    Result Review :   The following data was reviewed by: Maxim oLu MD on 05/24/2023:  Lab Results   Component Value Date    HGB 13.2 05/16/2023    HCT 38.7 05/16/2023    MCV 92.6 05/16/2023     05/16/2023    WBC 5.79 05/16/2023    NEUTROABS 3.61 03/18/2022    LYMPHSABS 1.01 03/18/2022    MONOSABS 0.31 03/18/2022    EOSABS 0.06 03/18/2022    BASOSABS 0.03 03/18/2022     Lab Results   Component Value Date    GLUCOSE 94 05/16/2023    BUN 15 05/16/2023    CREATININE 0.99 05/16/2023     05/16/2023    K 4.5 05/16/2023     (H) 05/16/2023    CO2 24.8 05/16/2023    CALCIUM 9.6  05/16/2023    PROTEINTOT 6.6 05/16/2023    ALBUMIN 4.3 05/16/2023    BILITOT 0.2 05/16/2023    ALKPHOS 64 05/16/2023    AST 16 05/16/2023    ALT 19 05/16/2023     Lab Results   Component Value Date    MG 2.2 05/16/2023    TSH 1.290 05/16/2023     No results found for: IRON, LABIRON, TRANSFERRIN, TIBC  No results found for: LDH, FERRITIN, QDLQHZCA80, FOLATE  No results found for: PSA, CEA, AFP, ,           Assessment and Plan    Diagnoses and all orders for this visit:    1. Malignant neoplasm of central portion of right breast in female, estrogen receptor positive (Primary)  Assessment & Plan:  Patient is on adjuvant treatment.  I see no evidence of disease recurrence per history or physical examination.  She has not had a menstrual cycle in more than 2 years.  Prior ovarian function was borderline but this would place her in the menopausal category.  I will switch her to letrozole 2.5 mg daily for the remainder of her minimum 5 years of treatment.  This has slightly more efficacy in terms of risk reduction for breast cancer versus tamoxifen and also does not carry the rare risk of endometrial cancer or blood clots.  We discussed arthralgias, myalgias, vasomotor symptoms and decreases in bone density as side effects from letrozole.  Written teaching information provided.  She is agreeable to the switch as discussed.  Prescription will be sent to pharmacy.  I will see her back in 6 weeks to assess tolerance to treatment.  She will be due for mammogram at that time.    Orders:  -     letrozole (FEMARA) 2.5 MG tablet; Take 1 tablet by mouth Daily.  Dispense: 60 tablet; Refill: 0    2. Encounter for screening mammogram for breast cancer  -     Mammo screening digital tomosynthesis bilateral w CAD; Future          Patient Follow Up: 6 weeks    Patient was given instructions and counseling regarding her condition or for health maintenance advice. Please see specific information pulled into the AVS if  appropriate.     Maxim oLu MD    5/24/2023

## 2023-05-24 NOTE — ASSESSMENT & PLAN NOTE
Patient is on adjuvant treatment.  I see no evidence of disease recurrence per history or physical examination.  She has not had a menstrual cycle in more than 2 years.  Prior ovarian function was borderline but this would place her in the menopausal category.  I will switch her to letrozole 2.5 mg daily for the remainder of her minimum 5 years of treatment.  This has slightly more efficacy in terms of risk reduction for breast cancer versus tamoxifen and also does not carry the rare risk of endometrial cancer or blood clots.  We discussed arthralgias, myalgias, vasomotor symptoms and decreases in bone density as side effects from letrozole.  Written teaching information provided.  She is agreeable to the switch as discussed.  Prescription will be sent to pharmacy.  I will see her back in 6 weeks to assess tolerance to treatment.  She will be due for mammogram at that time.

## 2023-06-06 ENCOUNTER — CLINICAL SUPPORT (OUTPATIENT)
Dept: FAMILY MEDICINE CLINIC | Age: 58
End: 2023-06-06
Payer: COMMERCIAL

## 2023-07-03 ENCOUNTER — APPOINTMENT (OUTPATIENT)
Dept: BONE DENSITY | Facility: HOSPITAL | Age: 58
End: 2023-07-03
Payer: COMMERCIAL

## 2023-07-11 ENCOUNTER — TELEPHONE (OUTPATIENT)
Dept: FAMILY MEDICINE CLINIC | Age: 58
End: 2023-07-11

## 2023-07-11 NOTE — TELEPHONE ENCOUNTER
Caller: Jae Damon    Relationship: Self    Best call back number: [phone]     What is the medical concern/diagnosis: FAST HEART RATE    What specialty or service is being requested: CARDIOLOGIST    What is the provider, practice or medical service name: UNKNOWN    What is the office location: UNKNOWN    What is the office phone number: UNKNOWN    Any additional details: PATIENT STATED THAT SHE DOES NOT CURRENTLY HAVE A FAST HEART RATE, BUT IT HAPPENS RANDOMLY. PATIENT WOULD LIKE TO BE REFERRED TO A CARDIOLOGIST AND WOULD LIKE NELIA SANCHEZ'S RECOMMENDATION.

## 2023-07-17 ENCOUNTER — TELEPHONE (OUTPATIENT)
Dept: CARDIOLOGY | Facility: CLINIC | Age: 58
End: 2023-07-17

## 2023-07-17 NOTE — TELEPHONE ENCOUNTER
Caller: Jae Damon A    Relationship to patient: Self    Best call back number: [phone]    Chief complaint: WOULD LIKE SOONER APPT     Type of visit: NEW PATIENT     Requested date: ASAP     If rescheduling, when is the original appointment: 08. 16.23    Additional notes:PATIENT WOULD LIKE SOONER APPT WITH DR PERRY. IF APPT IS AVAILABLE PLEASE CALL AND SCHEDULE WITH PATIENT THANK YOU

## 2023-07-31 ENCOUNTER — OFFICE VISIT (OUTPATIENT)
Dept: CARDIOLOGY | Facility: CLINIC | Age: 58
End: 2023-07-31
Payer: COMMERCIAL

## 2023-07-31 VITALS
SYSTOLIC BLOOD PRESSURE: 122 MMHG | HEIGHT: 65 IN | DIASTOLIC BLOOD PRESSURE: 87 MMHG | WEIGHT: 189.6 LBS | HEART RATE: 102 BPM | BODY MASS INDEX: 31.59 KG/M2

## 2023-07-31 DIAGNOSIS — R00.2 PALPITATIONS: Primary | ICD-10-CM

## 2023-07-31 DIAGNOSIS — Z82.49 FAMILY HISTORY OF CORONARY ARTERIOSCLEROSIS: ICD-10-CM

## 2023-07-31 PROCEDURE — 93000 ELECTROCARDIOGRAM COMPLETE: CPT | Performed by: INTERNAL MEDICINE

## 2023-07-31 PROCEDURE — 99204 OFFICE O/P NEW MOD 45 MIN: CPT | Performed by: INTERNAL MEDICINE

## 2023-08-22 DIAGNOSIS — I10 PRIMARY HYPERTENSION: Chronic | ICD-10-CM

## 2023-08-23 RX ORDER — VALSARTAN 80 MG/1
TABLET ORAL
Qty: 60 TABLET | Refills: 1 | Status: SHIPPED | OUTPATIENT
Start: 2023-08-23

## 2023-08-25 ENCOUNTER — HOSPITAL ENCOUNTER (OUTPATIENT)
Dept: CT IMAGING | Facility: HOSPITAL | Age: 58
Discharge: HOME | End: 2023-08-25
Admitting: INTERNAL MEDICINE

## 2023-08-25 DIAGNOSIS — Z82.49 FAMILY HISTORY OF CORONARY ARTERIOSCLEROSIS: ICD-10-CM

## 2023-08-25 PROCEDURE — 75571 CT HRT W/O DYE W/CA TEST: CPT

## 2023-08-28 ENCOUNTER — TELEPHONE (OUTPATIENT)
Dept: CARDIOLOGY | Facility: CLINIC | Age: 58
End: 2023-08-28
Payer: COMMERCIAL

## 2023-08-28 NOTE — TELEPHONE ENCOUNTER
----- Message from RADHA Kim MD sent at 8/28/2023  9:37 AM EDT -----  Coronary calcium score was 0 which indicates no identifiable plaque at this point in the coronaries.  This is a low risk result.  This can be repeated in 3 to 5 years as a follow-up.    There was an incidental finding of a 2 mm nodule in the right lung (this is a very small nodule), the radiologist recommended follow-up imaging in 1 year to be considered.  Please forward to primary care and they can follow with annual imaging if clinically appropriate.

## 2023-08-29 PROBLEM — R91.1 LUNG NODULE, SOLITARY: Status: ACTIVE | Noted: 2023-08-29

## 2023-09-08 ENCOUNTER — TELEPHONE (OUTPATIENT)
Dept: CARDIOLOGY | Facility: CLINIC | Age: 58
End: 2023-09-08
Payer: COMMERCIAL

## 2023-09-08 NOTE — TELEPHONE ENCOUNTER
----- Message from RADHA Kim MD sent at 9/8/2023  9:22 AM EDT -----  Heart monitor showed predominantly normal rhythm, average heart rate was 92.  She had rare single irregular beats and one very brief run of heartbeats from the upper chamber the heart that lasted less than 2 seconds.  She indicated symptoms 12 times and these correlated mostly with normal rhythm, occasionally a single irregular beat    Based on this result I would not change her management at this time    Can be followed as needed

## 2023-09-21 DIAGNOSIS — C50.111 MALIGNANT NEOPLASM OF CENTRAL PORTION OF RIGHT BREAST IN FEMALE, ESTROGEN RECEPTOR POSITIVE: ICD-10-CM

## 2023-09-21 DIAGNOSIS — Z17.0 MALIGNANT NEOPLASM OF CENTRAL PORTION OF RIGHT BREAST IN FEMALE, ESTROGEN RECEPTOR POSITIVE: ICD-10-CM

## 2023-09-22 RX ORDER — LETROZOLE 2.5 MG/1
2.5 TABLET, FILM COATED ORAL DAILY
Qty: 90 TABLET | Refills: 1 | Status: SHIPPED | OUTPATIENT
Start: 2023-09-22

## 2023-10-22 DIAGNOSIS — I10 PRIMARY HYPERTENSION: Chronic | ICD-10-CM

## 2023-10-23 RX ORDER — VALSARTAN 80 MG/1
80 TABLET ORAL 2 TIMES DAILY
Qty: 60 TABLET | Refills: 1 | Status: SHIPPED | OUTPATIENT
Start: 2023-10-23

## 2023-12-23 DIAGNOSIS — I10 PRIMARY HYPERTENSION: Chronic | ICD-10-CM

## 2023-12-26 RX ORDER — VALSARTAN 80 MG/1
80 TABLET ORAL 2 TIMES DAILY
Qty: 60 TABLET | Refills: 1 | Status: SHIPPED | OUTPATIENT
Start: 2023-12-26

## 2024-01-10 ENCOUNTER — OFFICE VISIT (OUTPATIENT)
Dept: ONCOLOGY | Facility: HOSPITAL | Age: 59
End: 2024-01-10
Payer: COMMERCIAL

## 2024-01-10 VITALS
HEART RATE: 85 BPM | HEIGHT: 65 IN | OXYGEN SATURATION: 95 % | SYSTOLIC BLOOD PRESSURE: 119 MMHG | DIASTOLIC BLOOD PRESSURE: 69 MMHG | RESPIRATION RATE: 16 BRPM | TEMPERATURE: 98.2 F | BODY MASS INDEX: 31.7 KG/M2 | WEIGHT: 190.26 LBS

## 2024-01-10 DIAGNOSIS — Z12.31 ENCOUNTER FOR SCREENING MAMMOGRAM FOR BREAST CANCER: ICD-10-CM

## 2024-01-10 DIAGNOSIS — C50.111 MALIGNANT NEOPLASM OF CENTRAL PORTION OF RIGHT BREAST IN FEMALE, ESTROGEN RECEPTOR POSITIVE: Primary | ICD-10-CM

## 2024-01-10 DIAGNOSIS — Z17.0 MALIGNANT NEOPLASM OF CENTRAL PORTION OF RIGHT BREAST IN FEMALE, ESTROGEN RECEPTOR POSITIVE: Primary | ICD-10-CM

## 2024-01-10 PROCEDURE — G0463 HOSPITAL OUTPT CLINIC VISIT: HCPCS | Performed by: INTERNAL MEDICINE

## 2024-01-10 RX ORDER — LETROZOLE 2.5 MG/1
2.5 TABLET, FILM COATED ORAL DAILY
Qty: 30 TABLET | Refills: 5 | Status: SHIPPED | OUTPATIENT
Start: 2024-01-10

## 2024-01-10 NOTE — PROGRESS NOTES
Chief Complaint  Malignant neoplasm of central portion of right breast in fe    Nick, Christina, APRN  Nick, Crhistina, APRN    Lucille Damon presents to [facility] for ongoing treatment of her breast cancer.  She is on adjuvant letrozole.  She is taking as prescribed.  Send occasional hot flashes but nothing severe.  Size masses or adenopathy.  No unusual aches or pains.  She notes some cardiac trouble last year and is seeing cardiology.  She reports that they saw small pulmonary nodule during her cardiac evaluation.  She denies cough, chest pain, shortness of breath or hemoptysis.  She notes adequate appetite and energy level.  She is trying exercise.    Oncology/Hematology History Overview Note   invasive carcinoma of the right breast ER+, OK+, HER2-, Ki67-2%. Oncotype      score 16            Clinical Staging      T1c, N0, M0, stage IA            Treatments      Chemotherapy      2/9/21 Tamoxifen ordered        Radiation Therapy      XRT at [facility] 2/9/21 starting boost        Surgeries      12/1/20 Dr. Hawkins performed a right lumpectomy        5/23 postmenopausal.  Switched from tamoxifen to letrozole           Malignant neoplasm of central portion of right breast in female, estrogen receptor positive   7/1/2021 Initial Diagnosis    Breast cancer (CMS/HCC)     7/1/2021 Cancer Staged    Staging form: Breast, AJCC 8th Edition  - Clinical: cT1c, cN0, cM0, ER+, OK+, HER2- - Signed by Maxim Lou MD on 7/1/2021         Review of Systems   Constitutional:  Negative for appetite change, diaphoresis, fatigue, fever, unexpected weight gain and unexpected weight loss.   HENT:  Negative for hearing loss, mouth sores, sore throat, swollen glands, trouble swallowing and voice change.    Eyes:  Negative for blurred vision.   Respiratory:  Negative for cough, shortness of breath and wheezing.    Cardiovascular:  Negative for chest pain and palpitations.    Gastrointestinal:  Negative for abdominal pain, blood in stool, constipation, diarrhea, nausea and vomiting.   Endocrine: Negative for cold intolerance and heat intolerance.   Genitourinary:  Negative for difficulty urinating, dysuria, frequency, hematuria and urinary incontinence.   Musculoskeletal:  Negative for arthralgias, back pain and myalgias.   Skin:  Negative for rash, skin lesions and wound.   Neurological:  Negative for dizziness, seizures, weakness, numbness and headache.   Hematological:  Does not bruise/bleed easily.   Psychiatric/Behavioral:  Negative for depressed mood. The patient is not nervous/anxious.      Current Outpatient Medications on File Prior to Visit   Medication Sig Dispense Refill    aspirin 81 MG chewable tablet Chew 1 tablet Daily.      Magnesium 250 MG tablet Take 200 mg by mouth Daily.      valsartan (DIOVAN) 80 MG tablet TAKE 1 TABLET BY MOUTH TWICE A DAY 60 tablet 1    [DISCONTINUED] letrozole (FEMARA) 2.5 MG tablet TAKE 1 TABLET BY MOUTH DAILY 90 tablet 1    ASHWAGANDHA PO Take 2 capsules by mouth 2 (Two) Times a Day. Chewable (Patient not taking: Reported on 1/10/2024)       No current facility-administered medications on file prior to visit.       No Known Allergies  Past Medical History:   Diagnosis Date    Breast cancer     High blood pressure     Macromastia     Malignant neoplasm of central portion of right breast in female, estrogen receptor positive 2021    Malignant neoplasm of right breast in female, estrogen receptor positive 2021     Past Surgical History:   Procedure Laterality Date    BILATERAL BREAST REDUCTION  2020    BREAST LUMPECTOMY Right 2020     SECTION  2010    REDUCTION MAMMAPLASTY  2020     Social History     Socioeconomic History    Marital status:    Tobacco Use    Smoking status: Never    Smokeless tobacco: Never   Vaping Use    Vaping Use: Never used   Substance and Sexual Activity    Alcohol use: Never     "Drug use: Never    Sexual activity: Defer     Family History   Problem Relation Age of Onset    No Known Problems Mother     Diabetes Father     Heart attack Father         at 58 years old    Diabetes Sister     Diabetes Paternal Aunt     Diabetes Paternal Grandmother     Heart disease Other        Objective   Physical Exam  Vitals reviewed. Exam conducted with a chaperone present.   Constitutional:       General: She is not in acute distress.     Appearance: Normal appearance.   Cardiovascular:      Rate and Rhythm: Normal rate and regular rhythm.      Heart sounds: Normal heart sounds. No murmur heard.     No gallop.   Pulmonary:      Effort: Pulmonary effort is normal.      Breath sounds: Normal breath sounds.   Chest:   Breasts:     Right: Skin change (Well-healed surgical incisions on the breast and axilla) present.      Left: Skin change (Well-healed surgical incisions on the breast) present.   Abdominal:      General: Abdomen is flat. Bowel sounds are normal.      Palpations: Abdomen is soft.   Musculoskeletal:      Right lower leg: No edema.      Left lower leg: No edema.   Lymphadenopathy:      Upper Body:      Right upper body: No supraclavicular or axillary adenopathy.      Left upper body: No supraclavicular or axillary adenopathy.   Neurological:      Mental Status: She is alert.   Psychiatric:         Mood and Affect: Mood normal.         Behavior: Behavior normal.         Vitals:    01/10/24 1414   BP: 119/69   Pulse: 85   Resp: 16   Temp: 98.2 °F (36.8 °C)   SpO2: 95%   Weight: 86.3 kg (190 lb 4.1 oz)   Height: 164.8 cm (64.88\")   PainSc: 0-No pain     ECOG score: 0         PHQ-9 Total Score:                    Result Review :   The following data was reviewed by: Maxim Lou MD on 01/10/2024:  Lab Results   Component Value Date    HGB 13.2 05/16/2023    HCT 38.7 05/16/2023    MCV 92.6 05/16/2023     05/16/2023    WBC 5.79 05/16/2023    NEUTROABS 3.61 03/18/2022    LYMPHSABS 1.01 " "03/18/2022    MONOSABS 0.31 03/18/2022    EOSABS 0.06 03/18/2022    BASOSABS 0.03 03/18/2022     Lab Results   Component Value Date    GLUCOSE 94 05/16/2023    BUN 15 05/16/2023    CREATININE 0.99 05/16/2023     05/16/2023    K 4.5 05/16/2023     (H) 05/16/2023    CO2 24.8 05/16/2023    CALCIUM 9.6 05/16/2023    PROTEINTOT 6.6 05/16/2023    ALBUMIN 4.3 05/16/2023    BILITOT 0.2 05/16/2023    ALKPHOS 64 05/16/2023    AST 16 05/16/2023    ALT 19 05/16/2023     Lab Results   Component Value Date    MG 2.2 05/16/2023    TSH 1.290 05/16/2023     No results found for: \"IRON\", \"LABIRON\", \"TRANSFERRIN\", \"TIBC\"  No results found for: \"LDH\", \"FERRITIN\", \"[id number]\", \"FOLATE\"  No results found for: \"PSA\", \"CEA\", \"AFP\", \"\", \"\"          Assessment and Plan    Diagnoses and all orders for this visit:    1. Malignant neoplasm of central portion of right breast in female, estrogen receptor positive [C50.111, Z17.0] (Primary)  Assessment & Plan:  Patient is on adjuvant hormone therapy with letrozole.  She notes occasional vasomotor symptoms but otherwise tolerating well.  I see no evidence of disease recurrence by history or physical examination.  Continue letrozole for minimum 5 years.  Refills provided today.  I will see her back in 6 months for continued treatment with mammogram prior.    Orders:  -     letrozole (FEMARA) 2.5 MG tablet; Take 1 tablet by mouth Daily.  Dispense: 30 tablet; Refill: 5    2. Encounter for screening mammogram for breast cancer  -     Mammo screening digital tomosynthesis bilateral w CAD; Future            Patient Follow Up: 6 months    Patient was given instructions and counseling regarding her condition or for health maintenance advice. Please see specific information pulled into the AVS if appropriate.     Maxim Lou MD    1/10/2024            "

## 2024-01-10 NOTE — ASSESSMENT & PLAN NOTE
Patient is on adjuvant hormone therapy with letrozole.  She notes occasional vasomotor symptoms but otherwise tolerating well.  I see no evidence of disease recurrence by history or physical examination.  Continue letrozole for minimum 5 years.  Refills provided today.  I will see her back in 6 months for continued treatment with mammogram prior.

## 2024-01-12 ENCOUNTER — OFFICE VISIT (OUTPATIENT)
Dept: FAMILY MEDICINE CLINIC | Age: 59
End: 2024-01-12
Payer: COMMERCIAL

## 2024-01-12 VITALS
DIASTOLIC BLOOD PRESSURE: 85 MMHG | OXYGEN SATURATION: 97 % | WEIGHT: 189 LBS | BODY MASS INDEX: 31.49 KG/M2 | HEART RATE: 87 BPM | SYSTOLIC BLOOD PRESSURE: 131 MMHG | HEIGHT: 65 IN

## 2024-01-12 DIAGNOSIS — I10 PRIMARY HYPERTENSION: Primary | Chronic | ICD-10-CM

## 2024-01-12 DIAGNOSIS — Z13.6 SCREENING FOR CARDIOVASCULAR CONDITION: ICD-10-CM

## 2024-01-12 DIAGNOSIS — M65.30 TRIGGER FINGER, UNSPECIFIED FINGER, UNSPECIFIED LATERALITY: ICD-10-CM

## 2024-01-12 DIAGNOSIS — R00.2 PALPITATIONS: ICD-10-CM

## 2024-01-12 DIAGNOSIS — Z79.82 LONG TERM (CURRENT) USE OF ASPIRIN: ICD-10-CM

## 2024-01-12 PROCEDURE — 99214 OFFICE O/P EST MOD 30 MIN: CPT | Performed by: NURSE PRACTITIONER

## 2024-01-12 NOTE — PROGRESS NOTES
Chief Complaint  Jae Damon presents to [facility] for Hypertension      Subjective     History of Present Illness  Jae presents for follow up on hypertension.  Compliance with medication : Valsartan 80 BID as prescribed   Check of BP at home reported as well controlled.  No new concerns or problems to report.  Follows up with cardiology for her palpitations.  Work up negative at this point and is not ready to try to take medication for symptoms at this time. She still continues to have some time where her heart races with activity       She has been having problems with trigger finger  worse in right and present in both.  She has lost some strength and is wanting to get injections if needed and see ortho or hand specialist. She has tried conservative measures at home      Regarding her eye / torn retina and is looking for new local eye doctor  but does have a retina specialist  Needs [facility]    Continues to follow up with Oncologyfor history breast cancer and is taking letrozole       Assessment and Plan       Diagnoses and all orders for this visit:    1. Primary hypertension (Primary)  Comments:  continue to monitor at home  consider medication adjustment if elevation  FU in 6 months and labs before next appt  ordered today  Orders:  -     valsartan (DIOVAN) 80 MG tablet; Take 1 tablet by mouth 2 (Two) Times a Day.  Dispense: 60 tablet; Refill: 5    2. Palpitations  Comments:  discussed treatment with BB- declines at this time and will follow up if persists or desires treatment  Orders:  -     Magnesium; Future    3. Trigger finger, unspecified finger, bilateral  Comments:  referal to hand specialist for further recommendations  Orders:  -     Ambulatory Referral to Hand Surgery    4. Long term (current) use of aspirin  Comments:  check CBC  Orders:  -     CBC No Differential; Future    5. Screening for cardiovascular condition  -     Comprehensive metabolic panel;  "Future  -     Lipid panel; Future            Follow Up   Return in about 6 months (around 7/12/2024) for Recheck, Annual physical.  Future Appointments   Date Time Provider Department Center   7/11/2024  3:30 PM Maxim Lou MD [facility] [id number]   7/19/2024  2:15 PM Christina Romero APRN [facility]       New Medications Ordered This Visit   Medications    valsartan (DIOVAN) 80 MG tablet     Sig: Take 1 tablet by mouth 2 (Two) Times a Day.     Dispense:  60 tablet     Refill:  5       Medications Discontinued During This Encounter   Medication Reason    ASHWAGANDHA PO Non-compliance    valsartan (DIOVAN) 80 MG tablet Reorder          Review of Systems    Objective     Vitals:    01/12/24 1532   BP: 131/85   BP Location: Left arm   Patient Position: Sitting   Pulse: 87   SpO2: 97%   Weight: 85.7 kg (189 lb)   Height: 164.8 cm (64.88\")     Body mass index is 31.57 kg/m².         Physical Exam  Constitutional:       General: She is not in acute distress.     Appearance: Normal appearance.   HENT:      Head: Normocephalic.   Cardiovascular:      Rate and Rhythm: Normal rate and regular rhythm.   Pulmonary:      Effort: Pulmonary effort is normal.      Breath sounds: Normal breath sounds.   Musculoskeletal:         General: Normal range of motion.   Neurological:      General: No focal deficit present.      Mental Status: She is alert and oriented to person, place, and time.   Psychiatric:         Mood and Affect: Mood normal.         Behavior: Behavior normal.            Result Review               No Known Allergies   Past Medical History:   Diagnosis Date    Breast cancer     High blood pressure     Macromastia     Malignant neoplasm of central portion of right breast in female, estrogen receptor positive 7/1/2021    Malignant neoplasm of right breast in female, estrogen receptor positive 7/1/2021     Current Outpatient Medications   Medication Sig Dispense Refill    aspirin 81 MG chewable tablet Chew 1 " tablet Daily.      letrozole (FEMARA) 2.5 MG tablet Take 1 tablet by mouth Daily. 30 tablet 5    Magnesium 250 MG tablet Take 200 mg by mouth Daily.      valsartan (DIOVAN) 80 MG tablet Take 1 tablet by mouth 2 (Two) Times a Day. 60 tablet 5     No current facility-administered medications for this visit.     Past Surgical History:   Procedure Laterality Date    BILATERAL BREAST REDUCTION  2020    BREAST LUMPECTOMY Right 2020     SECTION  2010    REDUCTION MAMMAPLASTY  2020      Health Maintenance Due   Topic Date Due    ZOSTER VACCINE (1 of 2) Never done    COVID-19 Vaccine (3 - 2023-24 season) 2023    BMI FOLLOWUP  10/31/2023      Immunization History   Administered Date(s) Administered    COVID-19 (PFIZER) Purple Cap Monovalent 2021, 2021    Influenza, Unspecified 10/01/2023         Part of this note may be an electronic transcription/translation of spoken language to printed   text using the Dragon Dictation System.      KARYN Andersen

## 2024-01-13 RX ORDER — VALSARTAN 80 MG/1
80 TABLET ORAL 2 TIMES DAILY
Qty: 60 TABLET | Refills: 5 | Status: SHIPPED | OUTPATIENT
Start: 2024-01-13

## 2024-05-03 ENCOUNTER — TELEPHONE (OUTPATIENT)
Dept: FAMILY MEDICINE CLINIC | Age: 59
End: 2024-05-03
Payer: COMMERCIAL

## 2024-05-03 DIAGNOSIS — Z79.82 LONG TERM (CURRENT) USE OF ASPIRIN: ICD-10-CM

## 2024-05-03 DIAGNOSIS — Z13.6 SCREENING FOR CARDIOVASCULAR CONDITION: Primary | ICD-10-CM

## 2024-05-03 NOTE — TELEPHONE ENCOUNTER
Pt was called to reschedule her physical that was on 7/19. It was rescheduled to 7/18 and she would like her labs ordered so she can complete them before this appt please.

## 2024-05-17 NOTE — TELEPHONE ENCOUNTER
Caller: Chiquita Damon    Relationship to patient: Self    Best call back number: [phone]    Patient is needing: TO SCHEDULE F/U APPT WITH DR. JACKSON. SHE STATES HER F/U APPT IS USUALLY SCHEDULED IN July WITH MAMMOGRAM BEFORE.   The patient is a 37y Male complaining of pain, ear.

## 2024-06-07 ENCOUNTER — TELEPHONE (OUTPATIENT)
Dept: FAMILY MEDICINE CLINIC | Age: 59
End: 2024-06-07
Payer: COMMERCIAL

## 2024-06-07 NOTE — TELEPHONE ENCOUNTER
1st attempt: spoke with pt re overdue labs ordered 5/3/24. States she will complete these 7/10/24 when she comes in for her mammogram. Will PP orders.

## 2024-07-10 ENCOUNTER — HOSPITAL ENCOUNTER (OUTPATIENT)
Dept: MAMMOGRAPHY | Facility: HOSPITAL | Age: 59
Discharge: HOME | End: 2024-07-10
Payer: COMMERCIAL

## 2024-07-10 ENCOUNTER — LAB (OUTPATIENT)
Dept: LAB | Facility: HOSPITAL | Age: 59
End: 2024-07-10
Payer: COMMERCIAL

## 2024-07-10 DIAGNOSIS — Z12.31 ENCOUNTER FOR SCREENING MAMMOGRAM FOR BREAST CANCER: ICD-10-CM

## 2024-07-10 DIAGNOSIS — R00.2 PALPITATIONS: ICD-10-CM

## 2024-07-10 DIAGNOSIS — Z79.82 LONG TERM (CURRENT) USE OF ASPIRIN: ICD-10-CM

## 2024-07-10 DIAGNOSIS — Z13.6 SCREENING FOR CARDIOVASCULAR CONDITION: ICD-10-CM

## 2024-07-10 LAB
ALBUMIN SERPL-MCNC: 4.4 G/DL (ref 3.5–5.2)
ALBUMIN/GLOB SERPL: 1.8 G/DL
ALP SERPL-CCNC: 108 U/L (ref 39–117)
ALT SERPL W P-5'-P-CCNC: 46 U/L (ref 1–33)
ANION GAP SERPL CALCULATED.3IONS-SCNC: 8.3 MMOL/L (ref 5–15)
AST SERPL-CCNC: 34 U/L (ref 1–32)
BILIRUB SERPL-MCNC: <0.2 MG/DL (ref 0–1.2)
BUN SERPL-MCNC: 13 MG/DL (ref 6–20)
BUN/CREAT SERPL: 15.7 (ref 7–25)
CALCIUM SPEC-SCNC: 9.8 MG/DL (ref 8.6–10.5)
CHLORIDE SERPL-SCNC: 109 MMOL/L (ref 98–107)
CHOLEST SERPL-MCNC: 198 MG/DL (ref 0–200)
CO2 SERPL-SCNC: 25.7 MMOL/L (ref 22–29)
CREAT SERPL-MCNC: 0.83 MG/DL (ref 0.57–1)
DEPRECATED RDW RBC AUTO: 41.3 FL (ref 37–54)
EGFRCR SERPLBLD CKD-EPI 2021: 81.3 ML/MIN/1.73
ERYTHROCYTE [DISTWIDTH] IN BLOOD BY AUTOMATED COUNT: 11.9 % (ref 12.3–15.4)
GLOBULIN UR ELPH-MCNC: 2.4 GM/DL
GLUCOSE SERPL-MCNC: 97 MG/DL (ref 65–99)
HCT VFR BLD AUTO: 40.5 % (ref 34–46.6)
HDLC SERPL-MCNC: 57 MG/DL (ref 40–60)
HGB BLD-MCNC: 13.6 G/DL (ref 12–15.9)
LDLC SERPL CALC-MCNC: 117 MG/DL (ref 0–100)
LDLC/HDLC SERPL: 2 {RATIO}
MAGNESIUM SERPL-MCNC: 2.2 MG/DL (ref 1.6–2.6)
MCH RBC QN AUTO: 31.7 PG (ref 26.6–33)
MCHC RBC AUTO-ENTMCNC: 33.6 G/DL (ref 31.5–35.7)
MCV RBC AUTO: 94.4 FL (ref 79–97)
PLATELET # BLD AUTO: 281 10*3/MM3 (ref 140–450)
PMV BLD AUTO: 9.2 FL (ref 6–12)
POTASSIUM SERPL-SCNC: 4.4 MMOL/L (ref 3.5–5.2)
PROT SERPL-MCNC: 6.8 G/DL (ref 6–8.5)
RBC # BLD AUTO: 4.29 10*6/MM3 (ref 3.77–5.28)
SODIUM SERPL-SCNC: 143 MMOL/L (ref 136–145)
TRIGL SERPL-MCNC: 135 MG/DL (ref 0–150)
VLDLC SERPL-MCNC: 24 MG/DL (ref 5–40)
WBC NRBC COR # BLD AUTO: 5.7 10*3/MM3 (ref 3.4–10.8)

## 2024-07-10 PROCEDURE — 85027 COMPLETE CBC AUTOMATED: CPT

## 2024-07-10 PROCEDURE — 77067 SCR MAMMO BI INCL CAD: CPT

## 2024-07-10 PROCEDURE — 77063 BREAST TOMOSYNTHESIS BI: CPT

## 2024-07-10 PROCEDURE — 36415 COLL VENOUS BLD VENIPUNCTURE: CPT

## 2024-07-10 PROCEDURE — 83735 ASSAY OF MAGNESIUM: CPT

## 2024-07-10 PROCEDURE — 80053 COMPREHEN METABOLIC PANEL: CPT

## 2024-07-10 PROCEDURE — 80061 LIPID PANEL: CPT

## 2024-07-11 ENCOUNTER — OFFICE VISIT (OUTPATIENT)
Dept: ONCOLOGY | Facility: HOSPITAL | Age: 59
End: 2024-07-11
Payer: COMMERCIAL

## 2024-07-11 VITALS
BODY MASS INDEX: 31.18 KG/M2 | WEIGHT: 194 LBS | RESPIRATION RATE: 20 BRPM | TEMPERATURE: 98.6 F | SYSTOLIC BLOOD PRESSURE: 134 MMHG | DIASTOLIC BLOOD PRESSURE: 78 MMHG | HEART RATE: 98 BPM | HEIGHT: 66 IN | OXYGEN SATURATION: 97 %

## 2024-07-11 DIAGNOSIS — Z17.0 MALIGNANT NEOPLASM OF CENTRAL PORTION OF RIGHT BREAST IN FEMALE, ESTROGEN RECEPTOR POSITIVE: Primary | ICD-10-CM

## 2024-07-11 DIAGNOSIS — C50.111 MALIGNANT NEOPLASM OF CENTRAL PORTION OF RIGHT BREAST IN FEMALE, ESTROGEN RECEPTOR POSITIVE: Primary | ICD-10-CM

## 2024-07-11 PROCEDURE — G0463 HOSPITAL OUTPT CLINIC VISIT: HCPCS | Performed by: INTERNAL MEDICINE

## 2024-07-11 RX ORDER — LETROZOLE 2.5 MG/1
2.5 TABLET, FILM COATED ORAL DAILY
Qty: 90 TABLET | Refills: 1 | Status: SHIPPED | OUTPATIENT
Start: 2024-07-11

## 2024-07-11 NOTE — ASSESSMENT & PLAN NOTE
Patient is on adjuvant hormone therapy with letrozole.  She notes occasional vasomotor symptoms but overall tolerating well.  I see no evidence of disease recurrence by history, physical examination or recent mammogram.  Lab work from her PCP noted showing slight elevation in the transaminases.  Recommend short interval follow-up with repeat lab work in 3 months.  I will see her back in 6 months for ongoing treatment of her breast cancer.  Refill of letrozole provided today.

## 2024-07-11 NOTE — PROGRESS NOTES
Chief Complaint  Malignant neoplasm of central portion of right breast in fe    Nick, Christina, APRN  Nick, Christina, APRN    Lucille Damon presents to [facility] for ongoing treatment of her breast cancer.  She is on adjuvant hormone therapy initially with tamoxifen started 2/21 but then transition to letrozole 5/23 at the time of menopause.  She is on letrozole currently and is compliant with the regimen.  She notes occasional hot flashes.  She has new masses, adenopathy, unusual aches or pains.    Oncology/Hematology History Overview Note   invasive carcinoma of the right breast ER+, VT+, HER2-, Ki67-2%. Oncotype      score 16            Clinical Staging      T1c, N0, M0, stage IA            Treatments      Chemotherapy      2/9/21 Tamoxifen ordered        Radiation Therapy      XRT at [facility] 2/9/21 starting boost        Surgeries      12/1/20 Dr. Hawkins performed a right lumpectomy        5/23 postmenopausal.  Switched from tamoxifen to letrozole           Malignant neoplasm of central portion of right breast in female, estrogen receptor positive   7/1/2021 Initial Diagnosis    Breast cancer (CMS/[facility])     7/1/2021 Cancer Staged    Staging form: Breast, AJCC 8th Edition  - Clinical: cT1c, cN0, cM0, ER+, VT+, HER2- - Signed by Maxim Lou MD on 7/1/2021           Current Outpatient Medications on File Prior to Visit   Medication Sig Dispense Refill    aspirin 81 MG chewable tablet Chew 1 tablet Daily.      Magnesium 250 MG tablet Take 200 mg by mouth Daily.      valsartan (DIOVAN) 80 MG tablet Take 1 tablet by mouth 2 (Two) Times a Day. 60 tablet 5    [DISCONTINUED] letrozole (FEMARA) 2.5 MG tablet Take 1 tablet by mouth Daily. 30 tablet 5     No current facility-administered medications on file prior to visit.       No Known Allergies  Past Medical History:   Diagnosis Date    Breast cancer     High blood pressure     Macromastia      "Malignant neoplasm of central portion of right breast in female, estrogen receptor positive 2021    Malignant neoplasm of right breast in female, estrogen receptor positive 2021     Past Surgical History:   Procedure Laterality Date    BILATERAL BREAST REDUCTION  2020    BREAST LUMPECTOMY Right 2020     SECTION  2010    REDUCTION MAMMAPLASTY  2020     Social History     Socioeconomic History    Marital status:    Tobacco Use    Smoking status: Never    Smokeless tobacco: Never   Vaping Use    Vaping status: Never Used   Substance and Sexual Activity    Alcohol use: Never    Drug use: Never    Sexual activity: Defer     Family History   Problem Relation Age of Onset    No Known Problems Mother     Diabetes Father     Heart attack Father         at 58 years old    Diabetes Sister     Diabetes Paternal Aunt     Diabetes Paternal Grandmother     Heart disease Other        Objective   Physical Exam  Vitals reviewed. Exam conducted with a chaperone present.   Cardiovascular:      Rate and Rhythm: Normal rate and regular rhythm.      Heart sounds: Normal heart sounds. No murmur heard.     No gallop.   Pulmonary:      Effort: Pulmonary effort is normal.      Breath sounds: Normal breath sounds.   Abdominal:      General: Bowel sounds are normal.   Musculoskeletal:      Right lower leg: No edema.      Left lower leg: No edema.   Lymphadenopathy:      Cervical: No cervical adenopathy.   Psychiatric:         Mood and Affect: Mood normal.         Behavior: Behavior normal.         Vitals:    24 1512   BP: 134/78   Pulse: 98   Resp: 20   Temp: 98.6 °F (37 °C)   SpO2: 97%   Weight: 88 kg (194 lb)   Height: 167.6 cm (66\")   PainSc: 0-No pain     ECOG score: 0         PHQ-9 Total Score:                    Result Review :   The following data was reviewed by: Maxim Lou MD on 2024:  Lab Results   Component Value Date    HGB 13.6 07/10/2024    HCT 40.5 07/10/2024    MCV 94.4 " "07/10/2024     07/10/2024    WBC 5.70 07/10/2024    NEUTROABS 3.61 03/18/2022    LYMPHSABS 1.01 03/18/2022    MONOSABS 0.31 03/18/2022    EOSABS 0.06 03/18/2022    BASOSABS 0.03 03/18/2022     Lab Results   Component Value Date    GLUCOSE 97 07/10/2024    BUN 13 07/10/2024    CREATININE 0.83 07/10/2024     07/10/2024    K 4.4 07/10/2024     (H) 07/10/2024    CO2 25.7 07/10/2024    CALCIUM 9.8 07/10/2024    PROTEINTOT 6.8 07/10/2024    ALBUMIN 4.4 07/10/2024    BILITOT <0.2 07/10/2024    ALKPHOS 108 07/10/2024    AST 34 (H) 07/10/2024    ALT 46 (H) 07/10/2024     Lab Results   Component Value Date    MG 2.2 07/10/2024    TSH 1.290 05/16/2023     No results found for: \"IRON\", \"LABIRON\", \"TRANSFERRIN\", \"TIBC\"  No results found for: \"LDH\", \"FERRITIN\", \"[id number]\", \"FOLATE\"  No results found for: \"PSA\", \"CEA\", \"AFP\", \"\", \"\"    Data reviewed : Radiologic studies mammogram reviewed       Assessment and Plan    Diagnoses and all orders for this visit:    1. Malignant neoplasm of central portion of right breast in female, estrogen receptor positive [C50.111, Z17.0] (Primary)  Assessment & Plan:  Patient is on adjuvant hormone therapy with letrozole.  She notes occasional vasomotor symptoms but overall tolerating well.  I see no evidence of disease recurrence by history, physical examination or recent mammogram.  Lab work from her PCP noted showing slight elevation in the transaminases.  Recommend short interval follow-up with repeat lab work in 3 months.  I will see her back in 6 months for ongoing treatment of her breast cancer.  Refill of letrozole provided today.    Orders:  -     letrozole (FEMARA) 2.5 MG tablet; Take 1 tablet by mouth Daily.  Dispense: 90 tablet; Refill: 1            Patient Follow Up: 6 months    Patient was given instructions and counseling regarding her condition or for health maintenance advice. Please see specific information pulled into the AVS if appropriate. "     Maxim Lou MD    7/11/2024

## 2024-07-23 ENCOUNTER — OFFICE VISIT (OUTPATIENT)
Dept: FAMILY MEDICINE CLINIC | Age: 59
End: 2024-07-23
Payer: COMMERCIAL

## 2024-07-23 VITALS
OXYGEN SATURATION: 100 % | HEIGHT: 66 IN | RESPIRATION RATE: 16 BRPM | HEART RATE: 77 BPM | WEIGHT: 191.8 LBS | TEMPERATURE: 98.1 F | SYSTOLIC BLOOD PRESSURE: 136 MMHG | DIASTOLIC BLOOD PRESSURE: 63 MMHG | BODY MASS INDEX: 30.82 KG/M2

## 2024-07-23 DIAGNOSIS — Z12.11 SCREENING FOR COLON CANCER: ICD-10-CM

## 2024-07-23 DIAGNOSIS — R74.8 ELEVATED LIVER ENZYMES: ICD-10-CM

## 2024-07-23 DIAGNOSIS — E66.09 CLASS 1 OBESITY DUE TO EXCESS CALORIES WITH SERIOUS COMORBIDITY AND BODY MASS INDEX (BMI) OF 30.0 TO 30.9 IN ADULT: ICD-10-CM

## 2024-07-23 DIAGNOSIS — I10 PRIMARY HYPERTENSION: Chronic | ICD-10-CM

## 2024-07-23 DIAGNOSIS — Z00.00 ROUTINE GENERAL MEDICAL EXAMINATION AT A HEALTH CARE FACILITY: Primary | ICD-10-CM

## 2024-07-23 DIAGNOSIS — R91.1 INCIDENTAL LUNG NODULE: ICD-10-CM

## 2024-07-23 PROCEDURE — 99396 PREV VISIT EST AGE 40-64: CPT | Performed by: NURSE PRACTITIONER

## 2024-07-23 PROCEDURE — 99214 OFFICE O/P EST MOD 30 MIN: CPT | Performed by: NURSE PRACTITIONER

## 2024-07-23 RX ORDER — VALSARTAN 80 MG/1
80 TABLET ORAL 2 TIMES DAILY
Qty: 180 TABLET | Refills: 1 | Status: SHIPPED | OUTPATIENT
Start: 2024-07-23

## 2024-07-23 NOTE — PROGRESS NOTES
Chief Complaint  Jae Damon presents to [facility] for Annual Exam      Subjective     History of Present Illness  aJe is here today for annual exam.   Last annual exam was 1 year  Last eye exam: 1 week  PROVIDER: [facility] (Dr. Freeman)  Last dental exam:   6 months    PROVIDER:  [facility]  Last menstrual period:  n/a ago.  Last Completed Pap Smear            PAP SMEAR (Every 3 Years) Next due on 10/31/2025      10/31/2022  IGP,rfx Aptima HPV All Pth    06/03/2019  Done                  Last Completed Mammogram            MAMMOGRAM (Yearly) Next due on 7/10/2025      07/10/2024  Mammo Screening Digital Tomosynthesis Bilateral With CAD    07/06/2023  Mammo Screening Digital Tomosynthesis Bilateral With CAD    07/05/2022  Mammo Diagnostic Digital Tomosynthesis Bilateral With CAD    12/30/2021  Mammo Diagnostic Digital Tomosynthesis Left With CAD    06/30/2021  Mammo diagnostic digital tomosynthesis left w CAD    Only the first 5 history entries have been loaded, but more history exists.                    Diet / exercise:   No special diet or specific exercise program  Patient's Body mass index is 30.97 kg/m². indicating that she is obese (BMI >30).    The 10-year ASCVD risk score (Geena DK, et al., 2019) is: 4.4%    Values used to calculate the score:      Age: 58 years      Sex: Female      Is Non- : No      Diabetic: No      Tobacco smoker: No      Systolic Blood Pressure: 136 mmHg      Is BP treated: Yes      HDL Cholesterol: 57 mg/dL      Total Cholesterol: 198 mg/dL     Jae Damon DECLINES THE FOLLOWING HEALTH MAINTENANCE RECOMMENDATIONS DISCUSSED TODAY:  >Tdap, >Zoster, and >Covid 19 vaccination   She did have a pre-consultation visit with Dr. Deluca at [facility] for her colonoscopy but did not get the procedure- however, she would like to have this scheduled again.        Patient Care Team:  Christina Romero APRN as  PCP - General (Nurse Practitioner)  dermatologist  Maxim Lou MD as Consulting Physician (Hematology and Oncology)  RADHA Kim MD as Consulting Physician (Cardiology)     History of right breast cancer.  Currently followed by Dr. Lou (last visit 7/11/2024) and taking Letrozol f/u every 6 months     Jae presents today for follow up on Hypertension.    Current medication / treatment includes Valsartan, and is compliant with medications.   no side effects reported.    Home blood pressure monitoring readings reported as home BP checks: It is well controlled when checked. and home BP readings are in the 120-130's/70's range  Medication changes are not recommended at this time.    Assessment and Plan     -well balanced diet and exercise as tolerated  -annual wellness exams are recommended  -health care maintenance and gaps in care discussed and orders have been placed as necessary and as willing by the patient.     Diagnoses and all orders for this visit:    1. Routine general medical examination at a health care facility (Primary)    2. Incidental lung nodule  Comments:  due in August for follow up 1 year  Orders:  -     CT Chest With Contrast; Future    3. Elevated liver enzymes  Comments:  repeat levels in 3 months  Orders:  -     Comprehensive Metabolic Panel; Future    4. Screening for colon cancer  Comments:  will need to get her colonoscopy scheduled (only had preconsultation visit at her last consult)  Orders:  -     Ambulatory Referral For Screening Colonoscopy    5. Primary hypertension  Comments:  continue current treatment   f/u 6 months  Orders:  -     valsartan (DIOVAN) 80 MG tablet; Take 1 tablet by mouth 2 (Two) Times a Day.  Dispense: 180 tablet; Refill: 1    6. Class 1 obesity due to excess calories with serious comorbidity and body mass index (BMI) of 30.0 to 30.9 in adult  Comments:  calorie counting, meal prepping- weight loss may improve chronic conditions including  "HTN            Follow Up   Return in about 6 months (around 1/23/2025) for Recheck.  Future Appointments   Date Time Provider Department Center   1/14/2025  8:00 AM Maxim Lou MD [facility] [place]   1/14/2025  9:00 AM Christina Romero APRN [facility] [place] [facility]       New Medications Ordered This Visit   Medications    valsartan (DIOVAN) 80 MG tablet     Sig: Take 1 tablet by mouth 2 (Two) Times a Day.     Dispense:  180 tablet     Refill:  1       Medications Discontinued During This Encounter   Medication Reason    valsartan (DIOVAN) 80 MG tablet Reorder    aspirin 81 MG chewable tablet Non-compliance            Review of Systems    Objective     Vitals:    07/23/24 0822   BP: 136/63   BP Location: Left arm   Patient Position: Sitting   Cuff Size: Adult   Pulse: 77   Resp: 16   Temp: 98.1 °F (36.7 °C)   TempSrc: Oral   SpO2: 100%   Weight: 87 kg (191 lb 12.8 oz)   Height: 167.6 cm (65.98\")     Body mass index is 30.97 kg/m².     BMI is >= 30 and <35. (Class 1 Obesity). The following options were offered after discussion;: exercise counseling/recommendations and nutrition counseling/recommendations      Physical Exam  Vitals reviewed.   Constitutional:       Appearance: Normal appearance. She is well-developed. She is obese. She is not ill-appearing.   HENT:      Head: Normocephalic and atraumatic.      Right Ear: Tympanic membrane, ear canal and external ear normal.      Left Ear: Tympanic membrane, ear canal and external ear normal.      Mouth/Throat:      Lips: Pink.      Mouth: Mucous membranes are moist.      Pharynx: Oropharynx is clear. Uvula midline. No oropharyngeal exudate.   Eyes:      Extraocular Movements: Extraocular movements intact.      Conjunctiva/sclera: Conjunctivae normal.      Pupils: Pupils are equal, round, and reactive to light.   Neck:      Thyroid: No thyromegaly.   Cardiovascular:      Rate and Rhythm: Normal rate and regular rhythm.      Heart sounds: No murmur heard.     No friction " rub. No gallop.   Pulmonary:      Effort: Pulmonary effort is normal.      Breath sounds: Normal breath sounds. No wheezing or rhonchi.   Abdominal:      General: Bowel sounds are normal. There is no distension.      Palpations: Abdomen is soft.      Tenderness: There is no abdominal tenderness.   Musculoskeletal:      Cervical back: Normal range of motion.   Lymphadenopathy:      Head:      Right side of head: No submandibular adenopathy.      Left side of head: No submandibular adenopathy.      Cervical: No cervical adenopathy.   Skin:     General: Skin is warm and dry.      Findings: No lesion or rash.   Neurological:      Mental Status: She is alert and oriented to person, place, and time.      Cranial Nerves: No cranial nerve deficit.      Gait: Gait is intact.   Psychiatric:         Mood and Affect: Mood and affect normal.         Behavior: Behavior normal.         Thought Content: Thought content normal.         Judgment: Judgment normal.            Result Review   The following data was reviewed by: KARYN Andersen on 07/23/2024:  Comprehensive metabolic panel (07/10/2024 07:43)  Lipid panel (07/10/2024 07:43)  CBC No Differential (07/10/2024 07:43)  Magnesium (07/10/2024 07:43)  Mammo Screening Digital Tomosynthesis Bilateral With CAD (07/10/2024 07:43)  CT Cardiac Calcium Score Without Dye (08/25/2023 11:56)                No Known Allergies   Past Medical History:   Diagnosis Date    Breast cancer     High blood pressure     Macromastia     Malignant neoplasm of central portion of right breast in female, estrogen receptor positive 7/1/2021    Malignant neoplasm of right breast in female, estrogen receptor positive 7/1/2021     Current Outpatient Medications   Medication Sig Dispense Refill    letrozole (FEMARA) 2.5 MG tablet Take 1 tablet by mouth Daily. 90 tablet 1    Magnesium 250 MG tablet Take 200 mg by mouth Daily.      valsartan (DIOVAN) 80 MG tablet Take 1 tablet by mouth 2 (Two) Times a Day.  180 tablet 1     No current facility-administered medications for this visit.     Past Surgical History:   Procedure Laterality Date    BILATERAL BREAST REDUCTION  2020    BREAST LUMPECTOMY Right 2020     SECTION  2010    REDUCTION MAMMAPLASTY  2020      Health Maintenance Due   Topic Date Due    BMI FOLLOWUP  10/31/2023      Immunization History   Administered Date(s) Administered    COVID-19 (PFIZER) Purple Cap Monovalent 2021, 2021    Influenza, Unspecified 10/01/2023         Part of this note may be an electronic transcription/translation of spoken language to printed   text using the Dragon Dictation System.      Christina Romero, APRN

## 2024-08-14 DIAGNOSIS — Z17.0 MALIGNANT NEOPLASM OF CENTRAL PORTION OF RIGHT BREAST IN FEMALE, ESTROGEN RECEPTOR POSITIVE: ICD-10-CM

## 2024-08-14 DIAGNOSIS — C50.111 MALIGNANT NEOPLASM OF CENTRAL PORTION OF RIGHT BREAST IN FEMALE, ESTROGEN RECEPTOR POSITIVE: ICD-10-CM

## 2024-08-14 NOTE — TELEPHONE ENCOUNTER
Caller: Jae Damon    Relationship: Self    Best call back number: [phone]    Requested Prescriptions:   Requested Prescriptions     Pending Prescriptions Disp Refills    letrozole (FEMARA) 2.5 MG tablet 90 tablet 1     Sig: Take 1 tablet by mouth Daily.        Pharmacy where request should be sent: [facility], [address] [phone] [facility] [phone] FX     Last office visit with prescribing clinician: 7/11/2024   Last telemedicine visit with prescribing clinician: Visit date not found   Next office visit with prescribing clinician: 1/14/2025     Additional details provided by patient: PATIENT NEEDS A NEW SCRIPT SENT TO [facility] WITH A 90 DAY SUPPLY. ONLY HAS A WEEK OF MEDICATION LEFT.    Does the patient have less than a 3 day supply:  [] Yes  [x] No    Would you like a call back once the refill request has been completed: [] Yes [x] No    If the office needs to give you a call back, can they leave a voicemail: [x] Yes [] No    Ravindra Dailey Rep   08/14/24 12:51 EDT

## 2024-08-16 RX ORDER — LETROZOLE 2.5 MG/1
2.5 TABLET, FILM COATED ORAL DAILY
Qty: 90 TABLET | Refills: 1 | Status: SHIPPED | OUTPATIENT
Start: 2024-08-16

## 2024-08-26 ENCOUNTER — HOSPITAL ENCOUNTER (OUTPATIENT)
Dept: CT IMAGING | Facility: HOSPITAL | Age: 59
Discharge: HOME | End: 2024-08-26
Admitting: NURSE PRACTITIONER
Payer: COMMERCIAL

## 2024-08-26 DIAGNOSIS — R91.1 INCIDENTAL LUNG NODULE: ICD-10-CM

## 2024-08-26 PROCEDURE — 25510000001 IOPAMIDOL PER 1 ML: Performed by: NURSE PRACTITIONER

## 2024-08-26 PROCEDURE — 71260 CT THORAX DX C+: CPT

## 2024-08-26 RX ORDER — IOPAMIDOL 755 MG/ML
100 INJECTION, SOLUTION INTRAVASCULAR
Status: COMPLETED | OUTPATIENT
Start: 2024-08-26 | End: 2024-08-26

## 2024-08-26 RX ADMIN — IOPAMIDOL 100 ML: 755 INJECTION, SOLUTION INTRAVENOUS at 11:24

## 2024-08-27 DIAGNOSIS — E04.1 THYROID NODULE GREATER THAN OR EQUAL TO 1 CM IN DIAMETER INCIDENTALLY NOTED ON IMAGING STUDY: ICD-10-CM

## 2024-08-27 DIAGNOSIS — R91.1 INCIDENTAL LUNG NODULE: Primary | ICD-10-CM

## 2024-09-16 ENCOUNTER — HOSPITAL ENCOUNTER (OUTPATIENT)
Dept: ULTRASOUND IMAGING | Facility: HOSPITAL | Age: 59
Discharge: HOME | End: 2024-09-16
Admitting: NURSE PRACTITIONER
Payer: COMMERCIAL

## 2024-09-16 DIAGNOSIS — E04.1 THYROID NODULE GREATER THAN OR EQUAL TO 1 CM IN DIAMETER INCIDENTALLY NOTED ON IMAGING STUDY: ICD-10-CM

## 2024-09-16 PROCEDURE — 76536 US EXAM OF HEAD AND NECK: CPT

## 2024-10-24 ENCOUNTER — TELEPHONE (OUTPATIENT)
Dept: FAMILY MEDICINE CLINIC | Age: 59
End: 2024-10-24
Payer: COMMERCIAL

## 2024-10-24 NOTE — TELEPHONE ENCOUNTER
----- Message from Christina Romero sent at 10/24/2024 12:46 AM EDT -----    ----- Message -----  From: Christina Romero APRN  Sent: 10/23/2024  12:00 AM EDT  To: KARYN Andersen    Need repeat CMP for elevated liver enzymes

## 2024-10-29 ENCOUNTER — LAB (OUTPATIENT)
Dept: LAB | Facility: HOSPITAL | Age: 59
End: 2024-10-29
Payer: COMMERCIAL

## 2024-10-29 DIAGNOSIS — R74.8 ELEVATED LIVER ENZYMES: ICD-10-CM

## 2024-10-29 LAB
ALBUMIN SERPL-MCNC: 4.3 G/DL (ref 3.5–5.2)
ALBUMIN/GLOB SERPL: 1.7 G/DL
ALP SERPL-CCNC: 105 U/L (ref 39–117)
ALT SERPL W P-5'-P-CCNC: 24 U/L (ref 1–33)
ANION GAP SERPL CALCULATED.3IONS-SCNC: 10.8 MMOL/L (ref 5–15)
AST SERPL-CCNC: 21 U/L (ref 1–32)
BILIRUB SERPL-MCNC: 0.3 MG/DL (ref 0–1.2)
BUN SERPL-MCNC: 17 MG/DL (ref 6–20)
BUN/CREAT SERPL: 17 (ref 7–25)
CALCIUM SPEC-SCNC: 9.8 MG/DL (ref 8.6–10.5)
CHLORIDE SERPL-SCNC: 106 MMOL/L (ref 98–107)
CO2 SERPL-SCNC: 24.2 MMOL/L (ref 22–29)
CREAT SERPL-MCNC: 1 MG/DL (ref 0.57–1)
EGFRCR SERPLBLD CKD-EPI 2021: 65 ML/MIN/1.73
GLOBULIN UR ELPH-MCNC: 2.6 GM/DL
GLUCOSE SERPL-MCNC: 98 MG/DL (ref 65–99)
POTASSIUM SERPL-SCNC: 4.3 MMOL/L (ref 3.5–5.2)
PROT SERPL-MCNC: 6.9 G/DL (ref 6–8.5)
SODIUM SERPL-SCNC: 141 MMOL/L (ref 136–145)

## 2024-10-29 PROCEDURE — 80053 COMPREHEN METABOLIC PANEL: CPT

## 2024-10-29 PROCEDURE — 36415 COLL VENOUS BLD VENIPUNCTURE: CPT

## 2024-12-05 DIAGNOSIS — I10 PRIMARY HYPERTENSION: Chronic | ICD-10-CM

## 2024-12-05 RX ORDER — VALSARTAN 80 MG/1
80 TABLET ORAL 2 TIMES DAILY
Qty: 180 TABLET | Refills: 0 | Status: SHIPPED | OUTPATIENT
Start: 2024-12-05

## 2025-01-14 ENCOUNTER — OFFICE VISIT (OUTPATIENT)
Dept: ONCOLOGY | Facility: HOSPITAL | Age: 60
End: 2025-01-14
Payer: COMMERCIAL

## 2025-01-14 VITALS — BODY MASS INDEX: 32.21 KG/M2 | HEIGHT: 65 IN | TEMPERATURE: 97.6 F | HEART RATE: 88 BPM | WEIGHT: 193.34 LBS

## 2025-01-14 DIAGNOSIS — C50.111 MALIGNANT NEOPLASM OF CENTRAL PORTION OF RIGHT BREAST IN FEMALE, ESTROGEN RECEPTOR POSITIVE: ICD-10-CM

## 2025-01-14 DIAGNOSIS — Z17.0 MALIGNANT NEOPLASM OF CENTRAL PORTION OF RIGHT BREAST IN FEMALE, ESTROGEN RECEPTOR POSITIVE: ICD-10-CM

## 2025-01-14 PROCEDURE — 99214 OFFICE O/P EST MOD 30 MIN: CPT | Performed by: INTERNAL MEDICINE

## 2025-01-14 PROCEDURE — G0463 HOSPITAL OUTPT CLINIC VISIT: HCPCS | Performed by: INTERNAL MEDICINE

## 2025-01-14 RX ORDER — LETROZOLE 2.5 MG/1
2.5 TABLET, FILM COATED ORAL DAILY
Qty: 90 TABLET | Refills: 1 | Status: SHIPPED | OUTPATIENT
Start: 2025-01-14

## 2025-01-14 NOTE — PROGRESS NOTES
Chief Complaint  No chief complaint on file.    Christina Romero APRN Mouser, Martina, APRN Subjective          Jae Damon presents to [facility] for for ongoing treatment of her breast cancer.  She is on adjuvant letrozole.  She is taking her medication as prescribed.  She notes occasional vasomotor symptoms but she is able to tolerate them.  No new masses, adenopathy, injection pains.  She is trying exercise.    Oncology/Hematology History Overview Note   invasive carcinoma of the right breast ER+, UT+, HER2-, Ki67-2%. Oncotype      score 16            Clinical Staging      T1c, N0, M0, stage IA            Treatments      Chemotherapy      2/9/21 Tamoxifen ordered        Radiation Therapy      XRT at [facility] 2/9/21 starting boost        Surgeries      12/1/20 Dr. Hawkins performed a right lumpectomy        5/23 postmenopausal.  Switched from tamoxifen to letrozole           Malignant neoplasm of central portion of right breast in female, estrogen receptor positive   7/1/2021 Initial Diagnosis    Breast cancer (CMS/[facility])     7/1/2021 Cancer Staged    Staging form: Breast, AJCC 8th Edition  - Clinical: cT1c, cN0, cM0, ER+, UT+, HER2- - Signed by Maxim Lou MD on 7/1/2021           Current Outpatient Medications on File Prior to Visit   Medication Sig Dispense Refill    Magnesium 250 MG tablet Take 200 mg by mouth Daily.      valsartan (DIOVAN) 80 MG tablet TAKE ONE TABLET BY MOUTH TWICE A  tablet 0     No current facility-administered medications on file prior to visit.       No Known Allergies  Past Medical History:   Diagnosis Date    Breast cancer     High blood pressure     Macromastia     Malignant neoplasm of central portion of right breast in female, estrogen receptor positive 7/1/2021    Malignant neoplasm of right breast in female, estrogen receptor positive 7/1/2021     Past Surgical History:   Procedure Laterality Date    BILATERAL BREAST  "REDUCTION  2020    BREAST LUMPECTOMY Right 2020     SECTION  2010    REDUCTION MAMMAPLASTY  2020     Social History     Socioeconomic History    Marital status:    Tobacco Use    Smoking status: Never    Smokeless tobacco: Never   Vaping Use    Vaping status: Never Used   Substance and Sexual Activity    Alcohol use: Never    Drug use: Never    Sexual activity: Defer     Family History   Problem Relation Age of Onset    No Known Problems Mother     Diabetes Father     Heart attack Father         at 58 years old    Diabetes Sister     Diabetes Paternal Aunt     Diabetes Paternal Grandmother     Heart disease Other        Objective   Physical Exam  Vitals reviewed. Exam conducted with a chaperone present.   Cardiovascular:      Rate and Rhythm: Normal rate and regular rhythm.      Heart sounds: Normal heart sounds. No murmur heard.     No gallop.   Pulmonary:      Breath sounds: Normal breath sounds.   Chest:   Breasts:     Right: Skin change (See diagram) present.      Left: Normal.       Abdominal:      General: Bowel sounds are normal.   Musculoskeletal:      Right lower leg: No edema.      Left lower leg: No edema.   Lymphadenopathy:      Cervical: No cervical adenopathy.      Upper Body:      Right upper body: No supraclavicular or axillary adenopathy.      Left upper body: No supraclavicular or axillary adenopathy.   Psychiatric:         Mood and Affect: Mood normal.         Behavior: Behavior normal.         Vitals:    25 0757   Pulse: 88   Temp: 97.6 °F (36.4 °C)   TempSrc: Temporal   Weight: 87.7 kg (193 lb 5.5 oz)   Height: 165.1 cm (65\")   PainSc: 0-No pain     ECOG score: 0         PHQ-9 Total Score:                    Result Review :   The following data was reviewed by: Maxim Lou MD on 2025:  Lab Results   Component Value Date    HGB 13.6 07/10/2024    HCT 40.5 07/10/2024    MCV 94.4 07/10/2024     07/10/2024    WBC 5.70 07/10/2024    NEUTROABS " "3.61 03/18/2022    LYMPHSABS 1.01 03/18/2022    MONOSABS 0.31 03/18/2022    EOSABS 0.06 03/18/2022    BASOSABS 0.03 03/18/2022     Lab Results   Component Value Date    GLUCOSE 98 10/29/2024    BUN 17 10/29/2024    CREATININE 1.00 10/29/2024     10/29/2024    K 4.3 10/29/2024     10/29/2024    CO2 24.2 10/29/2024    CALCIUM 9.8 10/29/2024    PROTEINTOT 6.9 10/29/2024    ALBUMIN 4.3 10/29/2024    BILITOT 0.3 10/29/2024    ALKPHOS 105 10/29/2024    AST 21 10/29/2024    ALT 24 10/29/2024     Lab Results   Component Value Date    MG 2.2 07/10/2024    TSH 1.290 05/16/2023     No results found for: \"IRON\", \"LABIRON\", \"TRANSFERRIN\", \"TIBC\"  No results found for: \"LDH\", \"FERRITIN\", \"[id number]\", \"FOLATE\"  No results found for: \"PSA\", \"CEA\", \"AFP\", \"\", \"\"          Assessment and Plan    Diagnoses and all orders for this visit:    1. Malignant neoplasm of central portion of right breast in female, estrogen receptor positive [C50.111, Z17.0]  Assessment & Plan:  Patient is on adjuvant hormone therapy initially started with tamoxifen 2/21 and then transition to letrozole 5/23 when she was menopausal.  Tolerating well except for vasomotor symptoms.  I see no evidence of disease recurrence per history or physical examination.  She is up-to-date on mammogram.  Continue for a minimum of 5 years.  Refills provided today.  She is trying exercise.  I will see her back in 6 months for continued treatment with mammogram prior.    Orders:  -     letrozole (FEMARA) 2.5 MG tablet; Take 1 tablet by mouth Daily.  Dispense: 90 tablet; Refill: 1  -     Mammo Screening Digital Tomosynthesis Bilateral With CAD; Future            Patient Follow Up: 6 months    Patient was given instructions and counseling regarding her condition or for health maintenance advice. Please see specific information pulled into the AVS if appropriate.     Maxim Lou MD    1/15/2025            "

## 2025-01-15 NOTE — ASSESSMENT & PLAN NOTE
Patient is on adjuvant hormone therapy initially started with tamoxifen 2/21 and then transition to letrozole 5/23 when she was menopausal.  Tolerating well except for vasomotor symptoms.  I see no evidence of disease recurrence per history or physical examination.  She is up-to-date on mammogram.  Continue for a minimum of 5 years.  Refills provided today.  She is trying exercise.  I will see her back in 6 months for continued treatment with mammogram prior.

## 2025-03-05 DIAGNOSIS — I10 PRIMARY HYPERTENSION: Chronic | ICD-10-CM

## 2025-03-05 RX ORDER — VALSARTAN 80 MG/1
80 TABLET ORAL 2 TIMES DAILY
Qty: 180 TABLET | Refills: 0 | Status: SHIPPED | OUTPATIENT
Start: 2025-03-05

## 2025-04-16 ENCOUNTER — OFFICE VISIT (OUTPATIENT)
Dept: FAMILY MEDICINE CLINIC | Age: 60
End: 2025-04-16
Payer: COMMERCIAL

## 2025-04-16 VITALS
WEIGHT: 194.4 LBS | HEIGHT: 65 IN | TEMPERATURE: 98.3 F | HEART RATE: 85 BPM | BODY MASS INDEX: 32.39 KG/M2 | DIASTOLIC BLOOD PRESSURE: 80 MMHG | OXYGEN SATURATION: 97 % | SYSTOLIC BLOOD PRESSURE: 137 MMHG

## 2025-04-16 DIAGNOSIS — I10 PRIMARY HYPERTENSION: Primary | Chronic | ICD-10-CM

## 2025-04-16 DIAGNOSIS — Z13.6 SCREENING FOR CARDIOVASCULAR CONDITION: ICD-10-CM

## 2025-04-16 DIAGNOSIS — E66.09 CLASS 1 OBESITY DUE TO EXCESS CALORIES WITH SERIOUS COMORBIDITY AND BODY MASS INDEX (BMI) OF 30.0 TO 30.9 IN ADULT: ICD-10-CM

## 2025-04-16 DIAGNOSIS — E66.811 CLASS 1 OBESITY DUE TO EXCESS CALORIES WITH SERIOUS COMORBIDITY AND BODY MASS INDEX (BMI) OF 30.0 TO 30.9 IN ADULT: ICD-10-CM

## 2025-04-16 DIAGNOSIS — Z13.1 SCREENING FOR DIABETES MELLITUS: ICD-10-CM

## 2025-04-16 PROCEDURE — 99214 OFFICE O/P EST MOD 30 MIN: CPT | Performed by: NURSE PRACTITIONER

## 2025-04-16 RX ORDER — VALSARTAN 80 MG/1
80 TABLET ORAL 2 TIMES DAILY
Qty: 180 TABLET | Refills: 1 | Status: SHIPPED | OUTPATIENT
Start: 2025-04-16

## 2025-04-16 RX ORDER — VALSARTAN 80 MG/1
80 TABLET ORAL 2 TIMES DAILY
Qty: 180 TABLET | Refills: 1 | Status: SHIPPED | OUTPATIENT
Start: 2025-04-16 | End: 2025-04-16

## 2025-04-16 NOTE — PROGRESS NOTES
Chief Complaint  Jae Damon presents to [facility] for Hypertension (6 month )    Subjective     History of Present Illness  Audrey is here for follow-up on hypertension.  She is currently taking valsartan 80 twice a day magnesium 200 mg once a day.  Her blood pressure is fairly well-controlled for the most part.  She denies any side effects or complications from the current medication.    Jae is wanting to try to get some assistance with weight loss.  She has been working on her diet, she has been eating well, she has been more active than usual and no results  Assessment and Plan     Diagnoses and all orders for this visit:    1. Primary hypertension (Primary)  Comments:  continue current treatment   f/u 6 months  Orders:  -     Discontinue: valsartan (DIOVAN) 80 MG tablet; Take 1 tablet by mouth 2 (Two) Times a Day.  Dispense: 180 tablet; Refill: 1  -     valsartan (DIOVAN) 80 MG tablet; Take 1 tablet by mouth 2 (Two) Times a Day.  Dispense: 180 tablet; Refill: 1    2. Class 1 obesity due to excess calories with serious comorbidity and body mass index (BMI) of 30.0 to 30.9 in adult  Comments:  weight loss may improvevd HTN;  will start on contrave   advised on SE;  work on diet/ meal tracking and f/u in 6-8 weeks after starting medicaiton  Orders:  -     naltrexone-bupropion ER (CONTRAVE) 8-90 MG tablet; Wk 1: 1 tab daily, Wk 2: 1 tab twice a day, Wk 3: 2 tabs in AM, 1 tab in PM, Wk 4: 2 tabs twice a day, Maintenance dose: 2 tabs twice daily.  Dispense: 120 tablet; Refill: 0    3. Screening for cardiovascular condition  -     Lipid panel; Future  -     Comprehensive metabolic panel; Future    4. Screening for diabetes mellitus  -     Hemoglobin A1c; Future            Follow Up   Return in about 8 weeks (around 6/11/2025) for Recheck.  Future Appointments   Date Time Provider Department Center   7/14/2025  7:30 AM MEMO ZURITA [facility] 1  [facility] PEREZ [facility]   7/17/2025  1:15 PM  "Maxim Lou [place] [facility] [place] [facility]   10/20/2025  3:00 PM Christina Romero APRN [facility] [place] [place] [facility]       New Medications Ordered This Visit   Medications    naltrexone-bupropion ER (CONTRAVE) 8-90 MG tablet     Sig: Wk 1: 1 tab daily, Wk 2: 1 tab twice a day, Wk 3: 2 tabs in AM, 1 tab in PM, Wk 4: 2 tabs twice a day, Maintenance dose: 2 tabs twice daily.     Dispense:  120 tablet     Refill:  0    valsartan (DIOVAN) 80 MG tablet     Sig: Take 1 tablet by mouth 2 (Two) Times a Day.     Dispense:  180 tablet     Refill:  1       Medications Discontinued During This Encounter   Medication Reason    valsartan (DIOVAN) 80 MG tablet Reorder    valsartan (DIOVAN) 80 MG tablet           Review of Systems   Constitutional:  Negative for chills, diaphoresis, fatigue and fever.   HENT:  Negative for congestion, sore throat and swollen glands.    Respiratory:  Negative for cough.    Cardiovascular:  Negative for chest pain.   Gastrointestinal:  Negative for abdominal pain, nausea and vomiting.   Genitourinary:  Negative for dysuria.   Musculoskeletal:  Positive for myalgias. Negative for neck pain.   Skin:  Negative for rash.   Neurological:  Negative for weakness and numbness.       Objective     Vitals:    04/16/25 1500   BP: 137/80   BP Location: Right arm   Patient Position: Sitting   Cuff Size: Large Adult   Pulse: 85   Temp: 98.3 °F (36.8 °C)   TempSrc: Oral   SpO2: 97%   Weight: 88.2 kg (194 lb 6.4 oz)   Height: 165.1 cm (65\")         Physical Exam  Constitutional:       General: She is not in acute distress.     Appearance: Normal appearance. She is obese.   HENT:      Head: Normocephalic.   Cardiovascular:      Rate and Rhythm: Normal rate and regular rhythm.   Pulmonary:      Effort: Pulmonary effort is normal.      Breath sounds: Normal breath sounds.   Musculoskeletal:         General: Normal range of motion.   Neurological:      General: No focal deficit present.      Mental Status: She is alert and oriented to " person, place, and time.   Psychiatric:         Mood and Affect: Mood normal.         Behavior: Behavior normal.               Result Review        No Known Allergies   Past Medical History:   Diagnosis Date    Breast cancer     High blood pressure     Macromastia     Malignant neoplasm of central portion of right breast in female, estrogen receptor positive 2021    Malignant neoplasm of right breast in female, estrogen receptor positive 2021     Current Outpatient Medications   Medication Sig Dispense Refill    letrozole (FEMARA) 2.5 MG tablet Take 1 tablet by mouth Daily. 90 tablet 1    Magnesium 250 MG tablet Take 200 mg by mouth Daily.      valsartan (DIOVAN) 80 MG tablet Take 1 tablet by mouth 2 (Two) Times a Day. 180 tablet 1    naltrexone-bupropion ER (CONTRAVE) 8-90 MG tablet Wk 1: 1 tab daily, Wk 2: 1 tab twice a day, Wk 3: 2 tabs in AM, 1 tab in PM, Wk 4: 2 tabs twice a day, Maintenance dose: 2 tabs twice daily. 120 tablet 0     No current facility-administered medications for this visit.     Past Surgical History:   Procedure Laterality Date    BILATERAL BREAST REDUCTION  2020    BREAST LUMPECTOMY Right 2020     SECTION  2010    REDUCTION MAMMAPLASTY  2020      There are no preventive care reminders to display for this patient.     Immunization History   Administered Date(s) Administered    COVID-19 (PFIZER) Purple Cap Monovalent 2021, 2021    Influenza, Unspecified 10/01/2023         Part of this note may be an electronic transcription/translation of spoken language to printed   text using the Dragon Dictation System.      KARYN Andersen

## 2025-05-07 ENCOUNTER — TELEPHONE (OUTPATIENT)
Dept: FAMILY MEDICINE CLINIC | Age: 60
End: 2025-05-07
Payer: COMMERCIAL

## 2025-05-09 ENCOUNTER — LAB (OUTPATIENT)
Dept: LAB | Facility: HOSPITAL | Age: 60
End: 2025-05-09
Payer: COMMERCIAL

## 2025-05-09 DIAGNOSIS — Z13.1 SCREENING FOR DIABETES MELLITUS: ICD-10-CM

## 2025-05-09 DIAGNOSIS — Z13.6 SCREENING FOR CARDIOVASCULAR CONDITION: ICD-10-CM

## 2025-05-09 LAB
ALBUMIN SERPL-MCNC: 4.5 G/DL (ref 3.5–5.2)
ALBUMIN/GLOB SERPL: 1.8 G/DL
ALP SERPL-CCNC: 111 U/L (ref 39–117)
ALT SERPL W P-5'-P-CCNC: 29 U/L (ref 1–33)
ANION GAP SERPL CALCULATED.3IONS-SCNC: 11 MMOL/L (ref 5–15)
AST SERPL-CCNC: 26 U/L (ref 1–32)
BILIRUB SERPL-MCNC: 0.3 MG/DL (ref 0–1.2)
BUN SERPL-MCNC: 13 MG/DL (ref 8–23)
BUN/CREAT SERPL: 14.8 (ref 7–25)
CALCIUM SPEC-SCNC: 9.7 MG/DL (ref 8.6–10.5)
CHLORIDE SERPL-SCNC: 104 MMOL/L (ref 98–107)
CHOLEST SERPL-MCNC: 186 MG/DL (ref 0–200)
CO2 SERPL-SCNC: 24 MMOL/L (ref 22–29)
CREAT SERPL-MCNC: 0.88 MG/DL (ref 0.57–1)
EGFRCR SERPLBLD CKD-EPI 2021: 75.3 ML/MIN/1.73
GLOBULIN UR ELPH-MCNC: 2.5 GM/DL
GLUCOSE SERPL-MCNC: 92 MG/DL (ref 65–99)
HBA1C MFR BLD: 5.7 % (ref 4.8–5.6)
HDLC SERPL-MCNC: 53 MG/DL (ref 40–60)
LDLC SERPL CALC-MCNC: 108 MG/DL (ref 0–100)
LDLC/HDLC SERPL: 1.97 {RATIO}
POTASSIUM SERPL-SCNC: 4.3 MMOL/L (ref 3.5–5.2)
PROT SERPL-MCNC: 7 G/DL (ref 6–8.5)
SODIUM SERPL-SCNC: 139 MMOL/L (ref 136–145)
TRIGL SERPL-MCNC: 144 MG/DL (ref 0–150)
VLDLC SERPL-MCNC: 25 MG/DL (ref 5–40)

## 2025-05-09 PROCEDURE — 80061 LIPID PANEL: CPT

## 2025-05-09 PROCEDURE — 80053 COMPREHEN METABOLIC PANEL: CPT

## 2025-05-09 PROCEDURE — 83036 HEMOGLOBIN GLYCOSYLATED A1C: CPT

## 2025-05-09 PROCEDURE — 36415 COLL VENOUS BLD VENIPUNCTURE: CPT

## 2025-05-30 DIAGNOSIS — C50.111 MALIGNANT NEOPLASM OF CENTRAL PORTION OF RIGHT BREAST IN FEMALE, ESTROGEN RECEPTOR POSITIVE: ICD-10-CM

## 2025-05-30 DIAGNOSIS — Z17.0 MALIGNANT NEOPLASM OF CENTRAL PORTION OF RIGHT BREAST IN FEMALE, ESTROGEN RECEPTOR POSITIVE: ICD-10-CM

## 2025-05-30 RX ORDER — LETROZOLE 2.5 MG/1
2.5 TABLET, FILM COATED ORAL DAILY
Refills: 1 | OUTPATIENT
Start: 2025-05-30

## 2025-06-08 DIAGNOSIS — I10 PRIMARY HYPERTENSION: Chronic | ICD-10-CM

## 2025-06-08 DIAGNOSIS — C50.111 MALIGNANT NEOPLASM OF CENTRAL PORTION OF RIGHT BREAST IN FEMALE, ESTROGEN RECEPTOR POSITIVE: ICD-10-CM

## 2025-06-08 DIAGNOSIS — Z17.0 MALIGNANT NEOPLASM OF CENTRAL PORTION OF RIGHT BREAST IN FEMALE, ESTROGEN RECEPTOR POSITIVE: ICD-10-CM

## 2025-06-09 RX ORDER — VALSARTAN 80 MG/1
80 TABLET ORAL 2 TIMES DAILY
Qty: 180 TABLET | Refills: 1 | OUTPATIENT
Start: 2025-06-09

## 2025-06-09 RX ORDER — LETROZOLE 2.5 MG/1
2.5 TABLET, FILM COATED ORAL DAILY
Qty: 90 TABLET | Refills: 1 | Status: SHIPPED | OUTPATIENT
Start: 2025-06-09

## 2025-07-14 ENCOUNTER — HOSPITAL ENCOUNTER (OUTPATIENT)
Dept: MAMMOGRAPHY | Facility: HOSPITAL | Age: 60
Discharge: HOME | End: 2025-07-14
Admitting: INTERNAL MEDICINE
Payer: COMMERCIAL

## 2025-07-14 DIAGNOSIS — Z17.0 MALIGNANT NEOPLASM OF CENTRAL PORTION OF RIGHT BREAST IN FEMALE, ESTROGEN RECEPTOR POSITIVE: ICD-10-CM

## 2025-07-14 DIAGNOSIS — C50.111 MALIGNANT NEOPLASM OF CENTRAL PORTION OF RIGHT BREAST IN FEMALE, ESTROGEN RECEPTOR POSITIVE: ICD-10-CM

## 2025-07-14 PROCEDURE — 77067 SCR MAMMO BI INCL CAD: CPT

## 2025-07-14 PROCEDURE — 77063 BREAST TOMOSYNTHESIS BI: CPT

## 2025-07-17 ENCOUNTER — OFFICE VISIT (OUTPATIENT)
Dept: ONCOLOGY | Facility: HOSPITAL | Age: 60
End: 2025-07-17
Payer: COMMERCIAL

## 2025-07-17 VITALS
TEMPERATURE: 98 F | SYSTOLIC BLOOD PRESSURE: 130 MMHG | BODY MASS INDEX: 32.18 KG/M2 | DIASTOLIC BLOOD PRESSURE: 89 MMHG | WEIGHT: 193.12 LBS | HEART RATE: 96 BPM | HEIGHT: 65 IN | RESPIRATION RATE: 18 BRPM | OXYGEN SATURATION: 96 %

## 2025-07-17 DIAGNOSIS — Z17.0 MALIGNANT NEOPLASM OF CENTRAL PORTION OF RIGHT BREAST IN FEMALE, ESTROGEN RECEPTOR POSITIVE: Primary | ICD-10-CM

## 2025-07-17 DIAGNOSIS — C50.111 MALIGNANT NEOPLASM OF CENTRAL PORTION OF RIGHT BREAST IN FEMALE, ESTROGEN RECEPTOR POSITIVE: Primary | ICD-10-CM

## 2025-07-17 DIAGNOSIS — Z78.0 POST-MENOPAUSAL: ICD-10-CM

## 2025-07-17 RX ORDER — MULTIPLE VITAMINS W/ MINERALS TAB 9MG-400MCG
1 TAB ORAL DAILY
COMMUNITY

## 2025-07-17 NOTE — PROGRESS NOTES
Chief Complaint  Malignant neoplasm of central portion of right breast in fe    Nick, Christina, APRN  iNck, Christina, APRN    Subjective          Jae Damon presents to [facility] for ongoing treatment of her breast cancer.  She is on adjuvant hormone therapy initially with tamoxifen started 2/21 and then transitioned to letrozole 5/23 when she became menopausal.  She is compliant with the regimen.  She does note some increased arthralgias with the letrozole but nothing that she finds intolerable.  She denies other masses, adenopathy.  She is exercising routinely.  She denies unusual aches or pains.    Oncology/Hematology History Overview Note   invasive carcinoma of the right breast ER+, HI+, HER2-, Ki67-2%. Oncotype      score 16            Clinical Staging      T1c, N0, M0, stage IA            Treatments      Chemotherapy      2/9/21 Tamoxifen ordered        Radiation Therapy      XRT at [facility] 2/9/21 starting boost        Surgeries      12/1/20 Dr. Hawkins performed a right lumpectomy        5/23 postmenopausal.  Switched from tamoxifen to letrozole           Malignant neoplasm of central portion of right breast in female, estrogen receptor positive   7/1/2021 Initial Diagnosis    Breast cancer (CMS/[facility])     7/1/2021 Cancer Staged    Staging form: Breast, AJCC 8th Edition  - Clinical: cT1c, cN0, cM0, ER+, HI+, HER2- - Signed by Maxim Lou MD on 7/1/2021           Current Outpatient Medications on File Prior to Visit   Medication Sig Dispense Refill    letrozole (FEMARA) 2.5 MG tablet Take 1 tablet by mouth Daily. 90 tablet 1    Magnesium 250 MG tablet Take 200 mg by mouth Daily.      multivitamin with minerals tablet tablet Take 1 tablet by mouth Daily.      naltrexone-bupropion ER (CONTRAVE) 8-90 MG tablet Wk 1: 1 tab daily, Wk 2: 1 tab twice a day, Wk 3: 2 tabs in AM, 1 tab in PM, Wk 4: 2 tabs twice a day, Maintenance dose: 2 tabs twice daily. (Patient  not taking: Reported on 2025) 120 tablet 0    valsartan (DIOVAN) 80 MG tablet Take 1 tablet by mouth 2 (Two) Times a Day. 180 tablet 1     No current facility-administered medications on file prior to visit.       No Known Allergies  Past Medical History:   Diagnosis Date    Breast cancer     High blood pressure     Macromastia     Malignant neoplasm of central portion of right breast in female, estrogen receptor positive 2021    Malignant neoplasm of right breast in female, estrogen receptor positive 2021     Past Surgical History:   Procedure Laterality Date    BILATERAL BREAST REDUCTION  2020    BREAST LUMPECTOMY Right 2020     SECTION  2010    REDUCTION MAMMAPLASTY  2020     Social History     Socioeconomic History    Marital status:    Tobacco Use    Smoking status: Never    Smokeless tobacco: Never   Vaping Use    Vaping status: Never Used   Substance and Sexual Activity    Alcohol use: Never    Drug use: Never    Sexual activity: Yes     Partners: Male     Birth control/protection: Post-menopausal, None     Family History   Problem Relation Age of Onset    No Known Problems Mother     Diabetes Father     Heart attack Father         at 58 years old    Diabetes Sister     Diabetes Paternal Aunt     Diabetes Paternal Grandmother     Heart disease Other        Objective   Physical Exam  Vitals reviewed.   Cardiovascular:      Rate and Rhythm: Normal rate and regular rhythm.      Heart sounds: Normal heart sounds. No murmur heard.     No gallop.   Pulmonary:      Effort: Pulmonary effort is normal.      Breath sounds: Normal breath sounds.   Chest:       Abdominal:      General: Bowel sounds are normal.   Lymphadenopathy:      Cervical: No cervical adenopathy.      Upper Body:      Right upper body: No supraclavicular or axillary adenopathy.      Left upper body: No supraclavicular or axillary adenopathy.   Psychiatric:         Mood and Affect: Mood normal.          "Behavior: Behavior normal.         Vitals:    07/17/25 1323   BP: 130/89   Pulse: 96   Resp: 18   Temp: 98 °F (36.7 °C)   TempSrc: Temporal   SpO2: 96%   Weight: 87.6 kg (193 lb 2 oz)   Height: 165.1 cm (65\")   PainSc: 0-No pain               PHQ-9 Total Score:                    Result Review :   The following data was reviewed by: Maxim Lou MD on 07/17/2025:  Lab Results   Component Value Date    HGB 13.6 07/10/2024    HCT 40.5 07/10/2024    MCV 94.4 07/10/2024     07/10/2024    WBC 5.70 07/10/2024    NEUTROABS 3.61 03/18/2022    LYMPHSABS 1.01 03/18/2022    MONOSABS 0.31 03/18/2022    EOSABS 0.06 03/18/2022    BASOSABS 0.03 03/18/2022     Lab Results   Component Value Date    GLUCOSE 92 05/09/2025    BUN 13 05/09/2025    CREATININE 0.88 05/09/2025     05/09/2025    K 4.3 05/09/2025     05/09/2025    CO2 24.0 05/09/2025    CALCIUM 9.7 05/09/2025    PROTEINTOT 7.0 05/09/2025    ALBUMIN 4.5 05/09/2025    BILITOT 0.3 05/09/2025    ALKPHOS 111 05/09/2025    AST 26 05/09/2025    ALT 29 05/09/2025     Lab Results   Component Value Date    MG 2.2 07/10/2024    TSH 1.290 05/16/2023     No results found for: \"IRON\", \"LABIRON\", \"TRANSFERRIN\", \"TIBC\"  No results found for: \"LDH\", \"FERRITIN\", \"BCTQGOUK81\", \"FOLATE\"  No results found for: \"PSA\", \"CEA\", \"AFP\", \"\", \"\"    Data reviewed : Radiologic studies mammogram reviewed      Assessment and Plan    Diagnoses and all orders for this visit:    1. Malignant neoplasm of central portion of right breast in female, estrogen receptor positive (Primary)  Assessment & Plan:  She is on adjuvant hormone therapy started 2/21.  She has some arthralgias from the regimen but tolerable.  I see no evidence of disease recurrence by her history, physical examination or recent mammogram.  Continue for a minimum of 5 years.  I will order breast cancer index to see if she would benefit from extended adjuvant therapy.  I will see her back in 6 months for ongoing " treatment.      2. Post-menopausal  Assessment & Plan:  DEXA scan will be obtained before next visit    Orders:  -     DEXA Bone Density Axial; Future            Patient Follow Up: 6 months    Patient was given instructions and counseling regarding her condition or for health maintenance advice. Please see specific information pulled into the AVS if appropriate.     Maxim Lou MD    7/17/2025

## 2025-07-17 NOTE — ASSESSMENT & PLAN NOTE
She is on adjuvant hormone therapy started 2/21.  She has some arthralgias from the regimen but tolerable.  I see no evidence of disease recurrence by her history, physical examination or recent mammogram.  Continue for a minimum of 5 years.  I will order breast cancer index to see if she would benefit from extended adjuvant therapy.  I will see her back in 6 months for ongoing treatment.

## 2025-08-27 ENCOUNTER — HOSPITAL ENCOUNTER (OUTPATIENT)
Dept: CT IMAGING | Facility: HOSPITAL | Age: 60
Discharge: HOME | End: 2025-08-27
Admitting: NURSE PRACTITIONER
Payer: COMMERCIAL

## 2025-08-27 DIAGNOSIS — R91.1 INCIDENTAL LUNG NODULE: ICD-10-CM

## 2025-08-27 LAB
CREAT BLDA-MCNC: 0.9 MG/DL (ref 0.6–1.3)
EGFRCR SERPLBLD CKD-EPI 2021: 73.3 ML/MIN/1.73

## 2025-08-27 PROCEDURE — 82565 ASSAY OF CREATININE: CPT

## 2025-08-27 PROCEDURE — 71260 CT THORAX DX C+: CPT

## 2025-08-27 PROCEDURE — 25510000001 IOPAMIDOL PER 1 ML: Performed by: NURSE PRACTITIONER

## 2025-08-27 RX ORDER — IOPAMIDOL 755 MG/ML
100 INJECTION, SOLUTION INTRAVASCULAR
Status: COMPLETED | OUTPATIENT
Start: 2025-08-27 | End: 2025-08-27

## 2025-08-27 RX ADMIN — IOPAMIDOL 100 ML: 755 INJECTION, SOLUTION INTRAVENOUS at 09:50

## 2025-08-28 RX ORDER — IOPAMIDOL 755 MG/ML
100 INJECTION, SOLUTION INTRAVASCULAR
Status: DISCONTINUED | OUTPATIENT
Start: 2025-08-28 | End: 2025-08-28